# Patient Record
Sex: FEMALE | Race: WHITE | Employment: PART TIME | URBAN - METROPOLITAN AREA
[De-identification: names, ages, dates, MRNs, and addresses within clinical notes are randomized per-mention and may not be internally consistent; named-entity substitution may affect disease eponyms.]

---

## 2017-01-06 ENCOUNTER — OFFICE VISIT (OUTPATIENT)
Dept: PERINATAL CARE | Facility: HOSPITAL | Age: 37
End: 2017-01-06
Attending: OBSTETRICS & GYNECOLOGY
Payer: COMMERCIAL

## 2017-01-06 VITALS
BODY MASS INDEX: 28 KG/M2 | HEART RATE: 90 BPM | DIASTOLIC BLOOD PRESSURE: 61 MMHG | WEIGHT: 144 LBS | SYSTOLIC BLOOD PRESSURE: 122 MMHG

## 2017-01-06 DIAGNOSIS — O09.513 AMA (ADVANCED MATERNAL AGE) PRIMIGRAVIDA 35+, THIRD TRIMESTER: Primary | ICD-10-CM

## 2017-01-06 DIAGNOSIS — O35.8XX0 SHORT FEMUR OF FETUS ON PRENATAL ULTRASOUND: ICD-10-CM

## 2017-01-06 PROBLEM — O35.HXX0 SHORT FEMUR OF FETUS ON PRENATAL ULTRASOUND (HCC): Status: ACTIVE | Noted: 2017-01-06

## 2017-01-06 PROBLEM — O35.HXX0 SHORT FEMUR OF FETUS ON PRENATAL ULTRASOUND: Status: ACTIVE | Noted: 2017-01-06

## 2017-01-06 PROCEDURE — 76805 OB US >/= 14 WKS SNGL FETUS: CPT

## 2017-01-06 PROCEDURE — 99214 OFFICE O/P EST MOD 30 MIN: CPT

## 2017-01-06 NOTE — PROGRESS NOTES
Sergo Cameron    Dear Dr. Jasson Johnson    Thank you for requesting ultrasound evaluation and maternal fetal medicine consultation on your patient Dolly Zhu.   As you are aware she is a 43year old female  with a [place] pr BENJAMÍN  10.8 cm. Placenta: posterior. Fetal Anatomy:  Visualized with normal appearance: 4 chamber heart and great vessels, bladder. Brain: Visualized and normal appearance: cerebellum. Gastrointestinal Tract: stomach visible.     Summary of Andrew Palmer

## 2017-01-13 ENCOUNTER — OFFICE VISIT (OUTPATIENT)
Dept: OBGYN CLINIC | Facility: CLINIC | Age: 37
End: 2017-01-13

## 2017-01-13 VITALS
WEIGHT: 146 LBS | SYSTOLIC BLOOD PRESSURE: 112 MMHG | BODY MASS INDEX: 28.66 KG/M2 | HEIGHT: 60 IN | DIASTOLIC BLOOD PRESSURE: 60 MMHG

## 2017-01-13 DIAGNOSIS — Z67.91 RH NEGATIVE STATE IN ANTEPARTUM PERIOD, THIRD TRIMESTER: ICD-10-CM

## 2017-01-13 DIAGNOSIS — O09.523 AMA (ADVANCED MATERNAL AGE) MULTIGRAVIDA 35+, THIRD TRIMESTER: Primary | ICD-10-CM

## 2017-01-13 DIAGNOSIS — O26.893 RH NEGATIVE STATE IN ANTEPARTUM PERIOD, THIRD TRIMESTER: ICD-10-CM

## 2017-01-13 DIAGNOSIS — Z23 NEED FOR VACCINATION: ICD-10-CM

## 2017-01-13 PROCEDURE — 96372 THER/PROPH/DIAG INJ SC/IM: CPT | Performed by: OBSTETRICS & GYNECOLOGY

## 2017-01-13 PROCEDURE — 90715 TDAP VACCINE 7 YRS/> IM: CPT | Performed by: OBSTETRICS & GYNECOLOGY

## 2017-01-13 PROCEDURE — 90471 IMMUNIZATION ADMIN: CPT | Performed by: OBSTETRICS & GYNECOLOGY

## 2017-01-13 NOTE — PROGRESS NOTES
ADAM  Doing well, +FM  Denies VB/LOF/uctx  Rh neg, rhogham and TDAP received   1hr glucose 198, 3hr wnl  RTC in 2 wks  Fetal movement instructions given  nsts 36 wks

## 2017-01-25 ENCOUNTER — NURSE ONLY (OUTPATIENT)
Dept: ALLERGY | Facility: CLINIC | Age: 37
End: 2017-01-25

## 2017-01-25 DIAGNOSIS — J30.89 ENVIRONMENTAL AND SEASONAL ALLERGIES: Primary | ICD-10-CM

## 2017-01-25 PROCEDURE — 95117 IMMUNOTHERAPY INJECTIONS: CPT | Performed by: ALLERGY & IMMUNOLOGY

## 2017-01-28 ENCOUNTER — OFFICE VISIT (OUTPATIENT)
Dept: OBGYN CLINIC | Facility: CLINIC | Age: 37
End: 2017-01-28

## 2017-01-28 VITALS
BODY MASS INDEX: 28.66 KG/M2 | WEIGHT: 146 LBS | HEIGHT: 60 IN | DIASTOLIC BLOOD PRESSURE: 62 MMHG | SYSTOLIC BLOOD PRESSURE: 102 MMHG

## 2017-01-28 DIAGNOSIS — O09.523 AMA (ADVANCED MATERNAL AGE) MULTIGRAVIDA 35+, THIRD TRIMESTER: Primary | ICD-10-CM

## 2017-01-28 NOTE — PROGRESS NOTES
ADAM  Doing well, +FM  Denies VB/LOF/uctx  Rh neg, rhogham received, TDAP received  RTC in 2 wks  Fetal movement instructions given  Size less than dates, MFM US showed approp growth but FL <3rd percentile, repeat Growth scan feb 2nd  nst 36 wks

## 2017-02-02 ENCOUNTER — TELEPHONE (OUTPATIENT)
Dept: OBGYN CLINIC | Facility: CLINIC | Age: 37
End: 2017-02-02

## 2017-02-02 ENCOUNTER — OFFICE VISIT (OUTPATIENT)
Dept: PERINATAL CARE | Facility: HOSPITAL | Age: 37
End: 2017-02-02
Attending: OBSTETRICS & GYNECOLOGY
Payer: COMMERCIAL

## 2017-02-02 VITALS
DIASTOLIC BLOOD PRESSURE: 63 MMHG | BODY MASS INDEX: 28.86 KG/M2 | RESPIRATION RATE: 16 BRPM | HEART RATE: 89 BPM | WEIGHT: 147 LBS | SYSTOLIC BLOOD PRESSURE: 119 MMHG | HEIGHT: 60 IN

## 2017-02-02 DIAGNOSIS — O36.5930 SGA (SMALL FOR GESTATIONAL AGE), FETAL, AFFECTING CARE OF MOTHER, ANTEPARTUM, THIRD TRIMESTER, NOT APPLICABLE OR UNSPECIFIED FETUS: ICD-10-CM

## 2017-02-02 DIAGNOSIS — O09.523 AMA (ADVANCED MATERNAL AGE) MULTIGRAVIDA 35+, THIRD TRIMESTER: Primary | ICD-10-CM

## 2017-02-02 PROCEDURE — 76820 UMBILICAL ARTERY ECHO: CPT

## 2017-02-02 PROCEDURE — 76819 FETAL BIOPHYS PROFIL W/O NST: CPT

## 2017-02-02 PROCEDURE — 76805 OB US >/= 14 WKS SNGL FETUS: CPT

## 2017-02-02 PROCEDURE — 99214 OFFICE O/P EST MOD 30 MIN: CPT

## 2017-02-02 NOTE — PROGRESS NOTES
Pt here for growth US/ femur measuring small  States +FM  Complaints: increase in contractions/ marion poon. Denies discharge. States little bit of cramping.

## 2017-02-02 NOTE — TELEPHONE ENCOUNTER
Pt dropped off [facility] Paperwork    Pt paid $25.00    Put in [facility] bin

## 2017-02-02 NOTE — PROGRESS NOTES
Cecille Vu    Dear Dr. Shekhar Maldonado    Thank you for requesting ultrasound evaluation and maternal fetal medicine consultation on your patient Gricelda Rivera.   As you are aware she is a 43year old female  with a [place] pr (g) 1407 g  19th%     Fetal heart activity: present. Fetal heart rate: 153 bpm.   Fetal presentation: cephalic. Placenta: posterior. Fetal Anatomy:  Visualized with normal appearance: bladder. Brain: Visualized and normal appearance: cerebellum. of this diagnosis, enhanced fetal surveillance is advised with twice weekly AP testing (weekly NST's with OB, weekly BPP / Dopplers with MFM) and a follow-up growth U/S  in 3 weeks.   In the absence of non-reassuring fetal status or an earlier maternal aldo

## 2017-02-09 ENCOUNTER — MED REC SCAN ONLY (OUTPATIENT)
Dept: OBGYN CLINIC | Facility: CLINIC | Age: 37
End: 2017-02-09

## 2017-02-09 ENCOUNTER — OFFICE VISIT (OUTPATIENT)
Dept: PERINATAL CARE | Facility: HOSPITAL | Age: 37
End: 2017-02-09
Attending: OBSTETRICS & GYNECOLOGY
Payer: COMMERCIAL

## 2017-02-09 VITALS
HEART RATE: 92 BPM | DIASTOLIC BLOOD PRESSURE: 71 MMHG | SYSTOLIC BLOOD PRESSURE: 136 MMHG | BODY MASS INDEX: 29 KG/M2 | WEIGHT: 147 LBS

## 2017-02-09 DIAGNOSIS — O35.8XX0 SHORT FEMUR OF FETUS ON PRENATAL ULTRASOUND: ICD-10-CM

## 2017-02-09 DIAGNOSIS — O09.513 AMA (ADVANCED MATERNAL AGE) PRIMIGRAVIDA 35+, THIRD TRIMESTER: Primary | ICD-10-CM

## 2017-02-09 PROCEDURE — 76820 UMBILICAL ARTERY ECHO: CPT

## 2017-02-09 PROCEDURE — 99213 OFFICE O/P EST LOW 20 MIN: CPT

## 2017-02-09 PROCEDURE — 76819 FETAL BIOPHYS PROFIL W/O NST: CPT

## 2017-02-09 NOTE — PROGRESS NOTES
Taina Whitt   Dear Dr. Estefani Amaya   Thank you for requesting ultrasound evaluation and maternal fetal medicine consultation on your patient Melo Zuniga.  As you are aware she is a 43year old female  with a [place] pregn reassuring    ____________________________________________________________________________  Doppler:  Fetal Doppler:  Umbilical Artery: PS 26.4 cm/s    ED 15.09 cm/s    S/D ratio 2.98     RI 0.66    Right Middle Cerebral Artery: PS 44.8 cm/s    ED 11.24 cm ultrasound in 3 weeks   Twice weekly AP testing (weekly NST's with OB & weekly BPP/Dopplers with MFM). Deliver at 38 weeks or earlier if non-reassuring fetal status or an earlier maternal indication.     Thank you for allowing me to participate in the car

## 2017-02-11 ENCOUNTER — OFFICE VISIT (OUTPATIENT)
Dept: OBGYN CLINIC | Facility: CLINIC | Age: 37
End: 2017-02-11

## 2017-02-11 VITALS
BODY MASS INDEX: 29.06 KG/M2 | DIASTOLIC BLOOD PRESSURE: 76 MMHG | WEIGHT: 148 LBS | HEIGHT: 60 IN | SYSTOLIC BLOOD PRESSURE: 122 MMHG

## 2017-02-11 DIAGNOSIS — O09.93 HIGH-RISK PREGNANCY, THIRD TRIMESTER: Primary | ICD-10-CM

## 2017-02-11 DIAGNOSIS — O28.3 ABNORMAL FETAL ULTRASOUND: ICD-10-CM

## 2017-02-11 DIAGNOSIS — O09.523 AMA (ADVANCED MATERNAL AGE) MULTIGRAVIDA 35+, THIRD TRIMESTER: ICD-10-CM

## 2017-02-11 DIAGNOSIS — O36.5920 SMALL FOR DATES AFFECTING MANAGEMENT OF MOTHER, SECOND TRIMESTER, NOT APPLICABLE OR UNSPECIFIED FETUS: Primary | ICD-10-CM

## 2017-02-11 DIAGNOSIS — O36.5920 SMALL FOR DATES AFFECTING MANAGEMENT OF MOTHER, SECOND TRIMESTER, NOT APPLICABLE OR UNSPECIFIED FETUS: ICD-10-CM

## 2017-02-11 DIAGNOSIS — O09.521 AMA (ADVANCED MATERNAL AGE) MULTIGRAVIDA 35+, FIRST TRIMESTER: ICD-10-CM

## 2017-02-11 PROCEDURE — 59025 FETAL NON-STRESS TEST: CPT | Performed by: OBSTETRICS & GYNECOLOGY

## 2017-02-11 NOTE — PATIENT INSTRUCTIONS
[facility]  [phone]    FETAL MOVEMENT CHART    Begin counting the baby's movements when you wake in the morning, or at approximately 9:00 a.m. Count ten separate times that the baby moves.   A movement can be either a kick, a swish, 3p                        4p                        5p                        6p                           week 40     week 39     week 43        M T W T F S S M T W T F S S M T W T F S S   6a                        7a

## 2017-02-11 NOTE — PROGRESS NOTES
No problems since last seen. Good FM. Does not know sex of infant. Got flu and Tdap vaccines. Also got RhoGAM. Normal  testing with MFM this week. Growth ultrasound in 2 to 3 weeks. [facility] given. See weekly with NST's.

## 2017-02-14 ENCOUNTER — OFFICE VISIT (OUTPATIENT)
Dept: OBGYN CLINIC | Facility: CLINIC | Age: 37
End: 2017-02-14

## 2017-02-14 VITALS
HEIGHT: 60 IN | SYSTOLIC BLOOD PRESSURE: 108 MMHG | DIASTOLIC BLOOD PRESSURE: 62 MMHG | BODY MASS INDEX: 29.06 KG/M2 | WEIGHT: 148 LBS

## 2017-02-14 DIAGNOSIS — O36.5920 SMALL FOR DATES AFFECTING MANAGEMENT OF MOTHER, SECOND TRIMESTER, NOT APPLICABLE OR UNSPECIFIED FETUS: ICD-10-CM

## 2017-02-14 DIAGNOSIS — O09.521 AMA (ADVANCED MATERNAL AGE) MULTIGRAVIDA 35+, FIRST TRIMESTER: Primary | ICD-10-CM

## 2017-02-14 NOTE — PATIENT INSTRUCTIONS
FETAL MOVEMENT CHART    Begin counting the baby's movements when you awake in the morning, or at approximately 9:00 a.m. Count ten separate times that the baby moves. A movement can be either a kick, a swish, a turn or a flip of the baby inside.     Yamile Montiel

## 2017-02-14 NOTE — PROGRESS NOTES
ADAM  Doing well,  GOOD FM  Denies VB/LOF/uctx  RTC in 1 wk for ADAM/ NST- alternating BPP with MFM twice weekly  NST Reactive  Fetal movement instructions given

## 2017-02-16 ENCOUNTER — OFFICE VISIT (OUTPATIENT)
Dept: PERINATAL CARE | Facility: HOSPITAL | Age: 37
End: 2017-02-16
Attending: OBSTETRICS & GYNECOLOGY
Payer: COMMERCIAL

## 2017-02-16 VITALS
WEIGHT: 148 LBS | SYSTOLIC BLOOD PRESSURE: 132 MMHG | DIASTOLIC BLOOD PRESSURE: 65 MMHG | BODY MASS INDEX: 29 KG/M2 | HEART RATE: 104 BPM

## 2017-02-16 DIAGNOSIS — O09.523 AMA (ADVANCED MATERNAL AGE) MULTIGRAVIDA 35+, THIRD TRIMESTER: Primary | ICD-10-CM

## 2017-02-16 PROCEDURE — 99213 OFFICE O/P EST LOW 20 MIN: CPT

## 2017-02-16 PROCEDURE — 76820 UMBILICAL ARTERY ECHO: CPT

## 2017-02-16 PROCEDURE — 76819 FETAL BIOPHYS PROFIL W/O NST: CPT

## 2017-02-16 NOTE — PROGRESS NOTES
Follow up MFM ultrasound with persistent short FL - probably constitutional. Small AC and 'borderline' IUGR. BENJAMÍN and fetal testing normal. No change in recommendations from previous ultrasound.

## 2017-02-16 NOTE — PROGRESS NOTES
Lexi Carter   Dear Dr. Ebony Thornton  Thank you for requesting ultrasound evaluation and maternal fetal medicine consultation on your patient Rodrigue Almeida.  As you are aware she is a 43year old female  with a [place] pregna reassuring    ____________________________________________________________________________  Doppler:  Fetal Doppler:  Umbilical Artery: PS 30.4 cm/s    ED 19.30 cm/s    S/D ratio 2.80     RI 0.64    Right Middle Cerebral Artery: PS 42.5 cm/s   Impression: BPP/Dopplers with MFM). Deliver at 38 weeks or earlier if non-reassuring fetal status or an earlier maternal indication. Thank you for allowing me to participate in the care of your patient.  Please do not hesitate to contact me if additional questions

## 2017-02-21 ENCOUNTER — OFFICE VISIT (OUTPATIENT)
Dept: OBGYN CLINIC | Facility: CLINIC | Age: 37
End: 2017-02-21

## 2017-02-21 VITALS
HEIGHT: 60 IN | DIASTOLIC BLOOD PRESSURE: 66 MMHG | SYSTOLIC BLOOD PRESSURE: 108 MMHG | BODY MASS INDEX: 29.45 KG/M2 | WEIGHT: 150 LBS

## 2017-02-21 DIAGNOSIS — Z3A.34 34 WEEKS GESTATION OF PREGNANCY: Primary | ICD-10-CM

## 2017-02-21 DIAGNOSIS — O35.8XX0 SHORT FEMUR OF FETUS ON PRENATAL ULTRASOUND: ICD-10-CM

## 2017-02-21 DIAGNOSIS — Z3A.34 34 WEEKS GESTATION OF PREGNANCY: ICD-10-CM

## 2017-02-21 DIAGNOSIS — O09.521 AMA (ADVANCED MATERNAL AGE) MULTIGRAVIDA 35+, FIRST TRIMESTER: Primary | ICD-10-CM

## 2017-02-21 DIAGNOSIS — O09.513 AMA (ADVANCED MATERNAL AGE) PRIMIGRAVIDA 35+, THIRD TRIMESTER: ICD-10-CM

## 2017-02-21 DIAGNOSIS — O09.521 AMA (ADVANCED MATERNAL AGE) MULTIGRAVIDA 35+, FIRST TRIMESTER: ICD-10-CM

## 2017-02-21 PROCEDURE — 59025 FETAL NON-STRESS TEST: CPT | Performed by: OBSTETRICS & GYNECOLOGY

## 2017-02-21 NOTE — PATIENT INSTRUCTIONS
FETAL MOVEMENT CHART    Begin counting the baby's movements when you awake in the morning, or at approximately 9:00 a.m. Count ten separate times that the baby moves. A movement can be either a kick, a swish, a turn or a flip of the baby inside.     Kaitlynn Mahan

## 2017-02-22 ENCOUNTER — TELEPHONE (OUTPATIENT)
Dept: OBGYN CLINIC | Facility: CLINIC | Age: 37
End: 2017-02-22

## 2017-02-23 ENCOUNTER — NURSE ONLY (OUTPATIENT)
Dept: ALLERGY | Facility: CLINIC | Age: 37
End: 2017-02-23

## 2017-02-23 ENCOUNTER — OFFICE VISIT (OUTPATIENT)
Dept: PERINATAL CARE | Facility: HOSPITAL | Age: 37
End: 2017-02-23
Attending: OBSTETRICS & GYNECOLOGY
Payer: COMMERCIAL

## 2017-02-23 ENCOUNTER — HOSPITAL ENCOUNTER (OUTPATIENT)
Facility: HOSPITAL | Age: 37
Discharge: HOME | End: 2017-02-23
Attending: OBSTETRICS & GYNECOLOGY | Admitting: OBSTETRICS & GYNECOLOGY
Payer: COMMERCIAL

## 2017-02-23 VITALS
BODY MASS INDEX: 29 KG/M2 | WEIGHT: 150 LBS | DIASTOLIC BLOOD PRESSURE: 69 MMHG | HEART RATE: 93 BPM | SYSTOLIC BLOOD PRESSURE: 124 MMHG

## 2017-02-23 DIAGNOSIS — J30.89 ENVIRONMENTAL AND SEASONAL ALLERGIES: Primary | ICD-10-CM

## 2017-02-23 DIAGNOSIS — O09.523 AMA (ADVANCED MATERNAL AGE) MULTIGRAVIDA 35+, THIRD TRIMESTER: ICD-10-CM

## 2017-02-23 DIAGNOSIS — O36.5930 IUGR (INTRAUTERINE GROWTH RESTRICTION) AFFECTING CARE OF MOTHER, THIRD TRIMESTER, NOT APPLICABLE OR UNSPECIFIED FETUS: Primary | ICD-10-CM

## 2017-02-23 PROCEDURE — 99214 OFFICE O/P EST MOD 30 MIN: CPT

## 2017-02-23 PROCEDURE — 76821 MIDDLE CEREBRAL ARTERY ECHO: CPT

## 2017-02-23 PROCEDURE — 96372 THER/PROPH/DIAG INJ SC/IM: CPT

## 2017-02-23 PROCEDURE — 76819 FETAL BIOPHYS PROFIL W/O NST: CPT

## 2017-02-23 PROCEDURE — 95165 ANTIGEN THERAPY SERVICES: CPT | Performed by: ALLERGY & IMMUNOLOGY

## 2017-02-23 PROCEDURE — 76820 UMBILICAL ARTERY ECHO: CPT

## 2017-02-23 PROCEDURE — 95117 IMMUNOTHERAPY INJECTIONS: CPT | Performed by: ALLERGY & IMMUNOLOGY

## 2017-02-23 PROCEDURE — 76805 OB US >/= 14 WKS SNGL FETUS: CPT

## 2017-02-23 RX ORDER — BETAMETHASONE SODIUM PHOSPHATE AND BETAMETHASONE ACETATE 3; 3 MG/ML; MG/ML
12 INJECTION, SUSPENSION INTRA-ARTICULAR; INTRALESIONAL; INTRAMUSCULAR; SOFT TISSUE ONCE
Status: DISCONTINUED | OUTPATIENT
Start: 2017-02-24 | End: 2017-02-23

## 2017-02-23 RX ORDER — BETAMETHASONE SODIUM PHOSPHATE AND BETAMETHASONE ACETATE 3; 3 MG/ML; MG/ML
INJECTION, SUSPENSION INTRA-ARTICULAR; INTRALESIONAL; INTRAMUSCULAR; SOFT TISSUE
Status: DISCONTINUED
Start: 2017-02-23 | End: 2017-02-23

## 2017-02-23 RX ORDER — BETAMETHASONE SODIUM PHOSPHATE AND BETAMETHASONE ACETATE 3; 3 MG/ML; MG/ML
12 INJECTION, SUSPENSION INTRA-ARTICULAR; INTRALESIONAL; INTRAMUSCULAR; SOFT TISSUE ONCE
Status: COMPLETED | OUTPATIENT
Start: 2017-02-23 | End: 2017-02-23

## 2017-02-23 NOTE — PROGRESS NOTES
Pt  Is a   Who is here for betamethasone #1. Injection given in l buttock and pt tolerated well. Verbal instructions given to f/u in M at 1530 tomorrow for BENJAMÍN and then to L&D for NST and Beta #2. Pt verbalizes understanding and agreement.

## 2017-02-23 NOTE — PROGRESS NOTES
Indication: Maternal age (43 years). ____________________________________________________________________________  History: Age: 43 years. Maternal age at [facility]: 43 years.   __________________________________________________________________________ 8.     Summary:  testing is reassuring. We discussed fetal movement counts.    ____________________________________________________________________________  Doppler:  Fetal Doppler:  Umbilical Artery: PS 43.3 cm/s    ED 21.16 cm/s    S/D ratio 2. percentile but normal HC:AC ratio. I interpreted the results and reviewed them with the patient.    DISCUSSION   During her visit we discussed and reviewed the following issues:   SHORT FEMUR LENGTH   Indeed the femur length is shorter remains than ex have NST & BENJAMÍN tomorrow 17  Delivery is recommended at 37 weeks or sooner if indicated by the  testing    Thank you for allowing me to participate in the care of your patient.  Please do not hesitate to contact me if additional questions or co

## 2017-02-24 ENCOUNTER — HOSPITAL ENCOUNTER (OUTPATIENT)
Facility: HOSPITAL | Age: 37
Discharge: HOME | End: 2017-02-24
Attending: OBSTETRICS & GYNECOLOGY | Admitting: OBSTETRICS & GYNECOLOGY
Payer: COMMERCIAL

## 2017-02-24 ENCOUNTER — HOSPITAL ENCOUNTER (OUTPATIENT)
Facility: HOSPITAL | Age: 37
End: 2017-02-24
Attending: OBSTETRICS & GYNECOLOGY | Admitting: OBSTETRICS & GYNECOLOGY
Payer: COMMERCIAL

## 2017-02-24 ENCOUNTER — OFFICE VISIT (OUTPATIENT)
Dept: PERINATAL CARE | Facility: HOSPITAL | Age: 37
End: 2017-02-24
Attending: OBSTETRICS & GYNECOLOGY
Payer: COMMERCIAL

## 2017-02-24 ENCOUNTER — APPOINTMENT (OUTPATIENT)
Dept: OBGYN CLINIC | Facility: HOSPITAL | Age: 37
End: 2017-02-24
Payer: COMMERCIAL

## 2017-02-24 VITALS — HEART RATE: 97 BPM | SYSTOLIC BLOOD PRESSURE: 132 MMHG | DIASTOLIC BLOOD PRESSURE: 74 MMHG

## 2017-02-24 VITALS
SYSTOLIC BLOOD PRESSURE: 125 MMHG | DIASTOLIC BLOOD PRESSURE: 77 MMHG | TEMPERATURE: 99 F | HEART RATE: 86 BPM | RESPIRATION RATE: 16 BRPM

## 2017-02-24 DIAGNOSIS — O36.5990 IUGR, ANTENATAL: ICD-10-CM

## 2017-02-24 DIAGNOSIS — O09.523 AMA (ADVANCED MATERNAL AGE) MULTIGRAVIDA 35+, THIRD TRIMESTER: ICD-10-CM

## 2017-02-24 PROBLEM — Z34.90 PREGNANCY: Status: ACTIVE | Noted: 2017-02-24

## 2017-02-24 PROBLEM — Z34.90 PREGNANCY (HCC): Status: ACTIVE | Noted: 2017-02-24

## 2017-02-24 PROCEDURE — 59025 FETAL NON-STRESS TEST: CPT | Performed by: OBSTETRICS & GYNECOLOGY

## 2017-02-24 PROCEDURE — 76819 FETAL BIOPHYS PROFIL W/O NST: CPT

## 2017-02-24 PROCEDURE — 76820 UMBILICAL ARTERY ECHO: CPT

## 2017-02-24 PROCEDURE — 99212 OFFICE O/P EST SF 10 MIN: CPT

## 2017-02-24 RX ORDER — BETAMETHASONE SODIUM PHOSPHATE AND BETAMETHASONE ACETATE 3; 3 MG/ML; MG/ML
12 INJECTION, SUSPENSION INTRA-ARTICULAR; INTRALESIONAL; INTRAMUSCULAR; SOFT TISSUE ONCE
Status: COMPLETED | OUTPATIENT
Start: 2017-02-24 | End: 2017-02-24

## 2017-02-24 NOTE — NST
Nonstress Test   Patient: Bailee Fuentes    Gestation: 34w5d    NST:       Variability: Moderate           Accelerations: Yes           Decelerations: None            Baseline: 135 BPM           Uterine Irritability: No           Contractions: Irregular

## 2017-02-24 NOTE — PROGRESS NOTES
Discharge instructions discussed with pt and spouse, both verb understanding, Pt rec'd copy. Pt ambulated out of unit stable and undelivered with spouse. Declines w/c escort out of unit.

## 2017-02-24 NOTE — PROGRESS NOTES
Indication: IUGR, Decreased BENJAMÍN.   ____________________________________________________________________________  History: Age: 43 years. Maternal age at [facility]: 43 years.   ____________________________________________________________________________  Dating: slightly dehydrated. No leaking fluid.     PHYSICAL EXAMINATION:   /74 mmHg  Pulse 97  General: alert and oriented, no acute distress   Abdomen: gravid, soft, non-tender       OBSTETRIC ULTRASOUND   BPP of 8/8 and normal umbilical Doppler velocimetry

## 2017-02-24 NOTE — PROGRESS NOTES
with edc 17 (34 week 5 days gestation) presents to triage 3 after MFM appointment for Betamethasone injection #2, and a NST. Pt states they have been watching the baby closely since it has been measuring small, and now the BENJAMÍN is also lower.  Pt

## 2017-02-28 ENCOUNTER — OFFICE VISIT (OUTPATIENT)
Dept: OBGYN CLINIC | Facility: CLINIC | Age: 37
End: 2017-02-28

## 2017-02-28 VITALS — SYSTOLIC BLOOD PRESSURE: 120 MMHG | WEIGHT: 148 LBS | BODY MASS INDEX: 29 KG/M2 | DIASTOLIC BLOOD PRESSURE: 72 MMHG

## 2017-02-28 DIAGNOSIS — O09.513 AMA (ADVANCED MATERNAL AGE) PRIMIGRAVIDA 35+, THIRD TRIMESTER: ICD-10-CM

## 2017-02-28 DIAGNOSIS — O36.5930 IUGR (INTRAUTERINE GROWTH RESTRICTION) AFFECTING CARE OF MOTHER, THIRD TRIMESTER, NOT APPLICABLE OR UNSPECIFIED FETUS: ICD-10-CM

## 2017-02-28 DIAGNOSIS — O09.513 AMA (ADVANCED MATERNAL AGE) PRIMIGRAVIDA 35+, THIRD TRIMESTER: Primary | ICD-10-CM

## 2017-02-28 DIAGNOSIS — O36.5930 IUGR (INTRAUTERINE GROWTH RESTRICTION) AFFECTING CARE OF MOTHER, THIRD TRIMESTER, NOT APPLICABLE OR UNSPECIFIED FETUS: Primary | ICD-10-CM

## 2017-02-28 PROCEDURE — 87081 CULTURE SCREEN ONLY: CPT | Performed by: NURSE PRACTITIONER

## 2017-02-28 PROCEDURE — 87147 CULTURE TYPE IMMUNOLOGIC: CPT | Performed by: NURSE PRACTITIONER

## 2017-02-28 NOTE — PATIENT INSTRUCTIONS
FETAL MOVEMENT CHART    Begin counting the baby's movements when you awake in the morning, or at approximately 9:00 a.m. Count ten separate times that the baby moves. A movement can be either a kick, a swish, a turn or a flip of the baby inside.     Vincent Goods

## 2017-02-28 NOTE — PATIENT INSTRUCTIONS
Drink 10 or more ounces daily of water    FETAL MOVEMENT CHART    Begin counting the baby's movements when you awake in the morning, or at approximately 9:00 a.m. Count ten separate times that the baby moves.   A movement can be either a kick, a swish, a

## 2017-02-28 NOTE — PROGRESS NOTES
ADAM  Doing well, +FM  Denies VB/LOF/uctx  Mode of delivery:  anticipated  Labor precautions discussed  SVE cl/ long/ -2  GBS collected  RTC 1 week  NST Reactive  Bedside BENJAMÍN done by Dr. Karen Valdez- 7.1  C-dil 3/12/17 6pm  IOL 3/13/17  Has BPP with MFM 3/3/1

## 2017-03-03 ENCOUNTER — OFFICE VISIT (OUTPATIENT)
Dept: PERINATAL CARE | Facility: HOSPITAL | Age: 37
End: 2017-03-03
Attending: OBSTETRICS & GYNECOLOGY
Payer: COMMERCIAL

## 2017-03-03 VITALS
DIASTOLIC BLOOD PRESSURE: 71 MMHG | WEIGHT: 147 LBS | SYSTOLIC BLOOD PRESSURE: 123 MMHG | HEART RATE: 111 BPM | BODY MASS INDEX: 29 KG/M2

## 2017-03-03 DIAGNOSIS — O28.8 AMNIOTIC FLUID INDEX BORDERLINE LOW: ICD-10-CM

## 2017-03-03 DIAGNOSIS — O36.5930 IUGR (INTRAUTERINE GROWTH RESTRICTION) AFFECTING CARE OF MOTHER, THIRD TRIMESTER, NOT APPLICABLE OR UNSPECIFIED FETUS: ICD-10-CM

## 2017-03-03 DIAGNOSIS — O35.8XX0 SHORT FEMUR OF FETUS ON PRENATAL ULTRASOUND: ICD-10-CM

## 2017-03-03 DIAGNOSIS — O09.523 AMA (ADVANCED MATERNAL AGE) MULTIGRAVIDA 35+, THIRD TRIMESTER: Primary | ICD-10-CM

## 2017-03-03 PROCEDURE — 99214 OFFICE O/P EST MOD 30 MIN: CPT

## 2017-03-03 PROCEDURE — 76819 FETAL BIOPHYS PROFIL W/O NST: CPT

## 2017-03-03 PROCEDURE — 76820 UMBILICAL ARTERY ECHO: CPT

## 2017-03-03 NOTE — PROGRESS NOTES
Quick Note:    Positive GBS, will need treatment in labor and delivery.  Left results on confidential voice mail.  ______

## 2017-03-03 NOTE — PROGRESS NOTES
Lucy Bryan   Dear Dr. Kwan Wang  Thank you for requesting ultrasound evaluation and maternal fetal medicine consultation on your patient Flor Pathak.  As you are aware she is a 43year old female  with a [place] pregna reassuring    ____________________________________________________________________________  Doppler:  Fetal Doppler:  Umbilical Artery: PS 57.4 cm/s    ED 19.65 cm/s    S/D ratio 2.80     RI 0.64    Right Middle Cerebral Artery: PS 57.0 cm/s    ED 17.68 cm testing  Low normal BENJAMÍN: 5.9 cm today, remained stable    RECOMMENDATIONS:   Continue care with Dr. Jolie Sinha   Twice weekly AP testing (weekly NST's with OB & weekly BPP/Dopplers with MFM).    Deliver at 37 weeks or earlier if non-reassuring fetal status or a

## 2017-03-06 ENCOUNTER — TELEPHONE (OUTPATIENT)
Dept: OBGYN UNIT | Facility: HOSPITAL | Age: 37
End: 2017-03-06

## 2017-03-07 ENCOUNTER — OFFICE VISIT (OUTPATIENT)
Dept: OBGYN CLINIC | Facility: CLINIC | Age: 37
End: 2017-03-07

## 2017-03-07 VITALS — WEIGHT: 146 LBS | BODY MASS INDEX: 29 KG/M2 | SYSTOLIC BLOOD PRESSURE: 108 MMHG | DIASTOLIC BLOOD PRESSURE: 74 MMHG

## 2017-03-07 DIAGNOSIS — O36.5930 IUGR (INTRAUTERINE GROWTH RESTRICTION) AFFECTING CARE OF MOTHER, THIRD TRIMESTER, NOT APPLICABLE OR UNSPECIFIED FETUS: ICD-10-CM

## 2017-03-07 DIAGNOSIS — O35.8XX0 SHORT FEMUR OF FETUS ON PRENATAL ULTRASOUND: ICD-10-CM

## 2017-03-07 DIAGNOSIS — O09.513 AMA (ADVANCED MATERNAL AGE) PRIMIGRAVIDA 35+, THIRD TRIMESTER: ICD-10-CM

## 2017-03-07 DIAGNOSIS — O36.5930 IUGR (INTRAUTERINE GROWTH RESTRICTION) AFFECTING CARE OF MOTHER, THIRD TRIMESTER, NOT APPLICABLE OR UNSPECIFIED FETUS: Primary | ICD-10-CM

## 2017-03-07 DIAGNOSIS — O09.93 HIGH-RISK PREGNANCY, THIRD TRIMESTER: Primary | ICD-10-CM

## 2017-03-07 PROCEDURE — 59025 FETAL NON-STRESS TEST: CPT | Performed by: OBSTETRICS & GYNECOLOGY

## 2017-03-07 NOTE — PROGRESS NOTES
Random B-H contractions only. Good FM. Will be seeing MFM on Friday. Has IOL scheduled already - Cervidil on , the  and IOL next day. Technique and risks for serial day IOL and increase risk of  section due to fetal indications discussed.

## 2017-03-07 NOTE — PROGRESS NOTES
Normal  testing with MFM. BENJAMÍN low but stable. Needs twice weekly testing - NST in our office and BPP/dopplers with MFM. Recommended delivery in 37th week.

## 2017-03-09 ENCOUNTER — TELEPHONE (OUTPATIENT)
Dept: OBGYN UNIT | Facility: HOSPITAL | Age: 37
End: 2017-03-09

## 2017-03-09 ENCOUNTER — NURSE ONLY (OUTPATIENT)
Dept: ALLERGY | Facility: CLINIC | Age: 37
End: 2017-03-09

## 2017-03-09 ENCOUNTER — TELEPHONE (OUTPATIENT)
Dept: OBGYN CLINIC | Facility: CLINIC | Age: 37
End: 2017-03-09

## 2017-03-09 DIAGNOSIS — J30.89 ENVIRONMENTAL AND SEASONAL ALLERGIES: Primary | ICD-10-CM

## 2017-03-09 PROCEDURE — 95165 ANTIGEN THERAPY SERVICES: CPT | Performed by: ALLERGY & IMMUNOLOGY

## 2017-03-09 PROCEDURE — 95117 IMMUNOTHERAPY INJECTIONS: CPT | Performed by: ALLERGY & IMMUNOLOGY

## 2017-03-09 RX ORDER — BREAST PUMP
EACH MISCELLANEOUS
Qty: 1 EACH | Refills: 0 | OUTPATIENT
Start: 2017-03-09 | End: 2017-07-11

## 2017-03-10 ENCOUNTER — OFFICE VISIT (OUTPATIENT)
Dept: PERINATAL CARE | Facility: HOSPITAL | Age: 37
End: 2017-03-10
Attending: OBSTETRICS & GYNECOLOGY
Payer: COMMERCIAL

## 2017-03-10 VITALS
HEART RATE: 94 BPM | WEIGHT: 147 LBS | BODY MASS INDEX: 28.86 KG/M2 | DIASTOLIC BLOOD PRESSURE: 78 MMHG | SYSTOLIC BLOOD PRESSURE: 131 MMHG | HEIGHT: 60 IN | RESPIRATION RATE: 18 BRPM

## 2017-03-10 DIAGNOSIS — O35.8XX0 SHORT FEMUR OF FETUS ON PRENATAL ULTRASOUND: Primary | ICD-10-CM

## 2017-03-10 DIAGNOSIS — O36.5930 IUGR (INTRAUTERINE GROWTH RESTRICTION) AFFECTING CARE OF MOTHER, THIRD TRIMESTER, NOT APPLICABLE OR UNSPECIFIED FETUS: ICD-10-CM

## 2017-03-10 DIAGNOSIS — O28.8 AMNIOTIC FLUID INDEX BORDERLINE LOW: ICD-10-CM

## 2017-03-10 PROCEDURE — 76820 UMBILICAL ARTERY ECHO: CPT

## 2017-03-10 PROCEDURE — 76819 FETAL BIOPHYS PROFIL W/O NST: CPT

## 2017-03-10 PROCEDURE — 99214 OFFICE O/P EST MOD 30 MIN: CPT

## 2017-03-10 NOTE — PROGRESS NOTES
Taina Whitt   Dear Dr. Rebel Azul  Thank you for requesting ultrasound evaluation and maternal fetal medicine consultation on your patient Melo Zuniga.  As you are aware she is a 43year old female  with a [place] pregna  testing is reassuring    ____________________________________________________________________________  Doppler:  Fetal Doppler:  Umbilical Artery: PS 39.7 cm/s    ED 15.09 cm/s    S/D ratio 2.65     RI 0.62    Right Middle Cerebral Artery: PS 60. invasive testing   Low normal BENJAMÍN: 6.5 cm today, remained stable    RECOMMENDATIONS:   Continue care with Dr. Edmund Davidson at 40 weeks or earlier if non-reassuring fetal status or an earlier maternal indication; IOL to start on Jesus 3/12/17    Thank

## 2017-03-10 NOTE — PROGRESS NOTES
Normal  testing with MFM including BPP and dopplers. BENJAMÍN up to 6.5 cm. Scheduled for Cervidil and IOL on 3/12/16.

## 2017-03-12 ENCOUNTER — APPOINTMENT (OUTPATIENT)
Dept: OBGYN CLINIC | Facility: HOSPITAL | Age: 37
End: 2017-03-12
Payer: COMMERCIAL

## 2017-03-12 ENCOUNTER — HOSPITAL ENCOUNTER (INPATIENT)
Facility: HOSPITAL | Age: 37
LOS: 4 days | Discharge: HOME | End: 2017-03-16
Attending: OBSTETRICS & GYNECOLOGY | Admitting: OBSTETRICS & GYNECOLOGY
Payer: COMMERCIAL

## 2017-03-12 LAB
ANTIBODY SCREEN: POSITIVE
BILIRUBIN URINE: NEGATIVE
BLOOD URINE: NEGATIVE
CONTROL RUN WITHIN 24 HOURS?: YES
ERYTHROCYTE [DISTWIDTH] IN BLOOD BY AUTOMATED COUNT: 12.9 % (ref 11.5–16)
GLUCOSE URINE: NEGATIVE
HCT VFR BLD AUTO: 38.5 % (ref 34–50)
HGB BLD-MCNC: 13.5 G/DL (ref 12–16)
KETONE URINE: 80
LEUKOCYTE ESTERASE URINE: NEGATIVE
MCH RBC QN AUTO: 30.5 PG (ref 27–33.2)
MCHC RBC AUTO-ENTMCNC: 35.1 G/DL (ref 31–37)
MCV RBC AUTO: 87.1 FL (ref 81–100)
NITRITE URINE: NEGATIVE
PH URINE: 5.5 (ref 5–8)
PLATELET # BLD AUTO: 231 10(3)UL (ref 150–450)
PROTEIN URINE: NEGATIVE
RBC # BLD AUTO: 4.42 X10(6)UL (ref 3.8–5.1)
RED CELL DISTRIBUTION WIDTH-SD: 39.9 FL (ref 35.1–46.3)
RH BLOOD TYPE: NEGATIVE
SPEC GRAVITY: 1.03 (ref 1–1.03)
T PALLIDUM AB SER QL IA: NONREACTIVE
URINE CLARITY: CLEAR
URINE COLOR: YELLOW
UROBILINOGEN URINE: 0.2
WBC # BLD AUTO: 10.4 X10(3) UL (ref 4–13)

## 2017-03-12 RX ORDER — DEXTROSE, SODIUM CHLORIDE, SODIUM LACTATE, POTASSIUM CHLORIDE, AND CALCIUM CHLORIDE 5; .6; .31; .03; .02 G/100ML; G/100ML; G/100ML; G/100ML; G/100ML
INJECTION, SOLUTION INTRAVENOUS AS NEEDED
Status: DISCONTINUED | OUTPATIENT
Start: 2017-03-12 | End: 2017-03-14 | Stop reason: HOSPADM

## 2017-03-12 RX ORDER — SODIUM CHLORIDE, SODIUM LACTATE, POTASSIUM CHLORIDE, CALCIUM CHLORIDE 600; 310; 30; 20 MG/100ML; MG/100ML; MG/100ML; MG/100ML
INJECTION, SOLUTION INTRAVENOUS CONTINUOUS
Status: DISCONTINUED | OUTPATIENT
Start: 2017-03-12 | End: 2017-03-14 | Stop reason: HOSPADM

## 2017-03-12 RX ORDER — PSEUDOEPHEDRINE HCL 120 MG/1
120 TABLET, FILM COATED, EXTENDED RELEASE ORAL EVERY 12 HOURS
Status: DISCONTINUED | OUTPATIENT
Start: 2017-03-12 | End: 2017-03-12

## 2017-03-12 RX ORDER — ZOLPIDEM TARTRATE 5 MG/1
5 TABLET ORAL NIGHTLY PRN
Status: DISCONTINUED | OUTPATIENT
Start: 2017-03-12 | End: 2017-03-14 | Stop reason: HOSPADM

## 2017-03-12 RX ORDER — TERBUTALINE SULFATE 1 MG/ML
0.25 INJECTION, SOLUTION SUBCUTANEOUS AS NEEDED
Status: DISCONTINUED | OUTPATIENT
Start: 2017-03-12 | End: 2017-03-14 | Stop reason: HOSPADM

## 2017-03-12 RX ORDER — ACETAMINOPHEN 500 MG
1000 TABLET ORAL EVERY 6 HOURS PRN
COMMUNITY
End: 2017-07-11

## 2017-03-12 RX ORDER — IBUPROFEN 600 MG/1
600 TABLET ORAL ONCE AS NEEDED
Status: DISCONTINUED | OUTPATIENT
Start: 2017-03-12 | End: 2017-03-14 | Stop reason: HOSPADM

## 2017-03-13 RX ORDER — ONDANSETRON 2 MG/ML
4 INJECTION INTRAMUSCULAR; INTRAVENOUS EVERY 6 HOURS PRN
Status: DISCONTINUED | OUTPATIENT
Start: 2017-03-13 | End: 2017-03-14

## 2017-03-13 RX ORDER — ACETAMINOPHEN 500 MG
1000 TABLET ORAL EVERY 6 HOURS PRN
Status: DISCONTINUED | OUTPATIENT
Start: 2017-03-13 | End: 2017-03-14

## 2017-03-13 RX ORDER — EPHEDRINE SULFATE 50 MG/ML
5 INJECTION, SOLUTION INTRAVENOUS AS NEEDED
Status: DISCONTINUED | OUTPATIENT
Start: 2017-03-13 | End: 2017-03-14

## 2017-03-13 RX ORDER — NALBUPHINE HCL 10 MG/ML
2.5 AMPUL (ML) INJECTION
Status: DISCONTINUED | OUTPATIENT
Start: 2017-03-13 | End: 2017-03-16

## 2017-03-13 RX ORDER — ONDANSETRON 2 MG/ML
INJECTION INTRAMUSCULAR; INTRAVENOUS
Status: COMPLETED
Start: 2017-03-13 | End: 2017-03-13

## 2017-03-13 NOTE — PROGRESS NOTES
Pt is a 43year old female admitted to   Patient presents with:  Scheduled Induction    Pt is a 37w0d intrauterine pregnancy  Pt is being induced per  MD for IUGR and borderline low BENJAMÍN  History obtained, consents, signed.    Pt oriented to room, staff,

## 2017-03-13 NOTE — PROGRESS NOTES
Received report from Shad, [facility]. RN to bedside. Assessment completed. POC discussed with patient and . Pt verbalized understanding. Call light within reach. Will continue to monitor.

## 2017-03-13 NOTE — PLAN OF CARE
Problem: SAFETY ADULT - FALL  Goal: Free from fall injury  INTERVENTIONS:  - Assess pt frequently for physical needs  - Identify cognitive and physical deficits and behaviors that affect risk of falls.   - [place] fall precautions as indicated by assessme

## 2017-03-13 NOTE — PROGRESS NOTES
Cx: 1-2 cm/50%/-3  AROM, no fluid seen  Epidural placed  Contractions 2-4 minutes  FHT's 145, accelerations present, moderate variability  CPM

## 2017-03-13 NOTE — H&P
[facility]  History & Physical    Rodrigue Almeida Patient Status:  Inpatient    1980 MRN [id number]   [facility] 1NW-A Attending Jovany Bergeron MD    1 PCP Sherri Evans DO     CC: patient is here for IOL, IUGR    SUBJECTIVE: Standard drinks or equivalent per week         Comment: when jnot pregnant       Home Meds:   Prescriptions prior to admission:  acetaminophen 500 MG Oral Tab Take 1,000 mg by mouth every 6 (six) hours as needed for Pain.  Disp:  Rfl:  3/12/2017 at 1500   V

## 2017-03-14 PROCEDURE — 0HQ9XZZ REPAIR PERINEUM SKIN, EXTERNAL APPROACH: ICD-10-PCS | Performed by: OBSTETRICS & GYNECOLOGY

## 2017-03-14 PROCEDURE — 59400 OBSTETRICAL CARE: CPT | Performed by: OBSTETRICS & GYNECOLOGY

## 2017-03-14 PROCEDURE — 3E033VJ INTRODUCTION OF OTHER HORMONE INTO PERIPHERAL VEIN, PERCUTANEOUS APPROACH: ICD-10-PCS | Performed by: OBSTETRICS & GYNECOLOGY

## 2017-03-14 RX ORDER — HYDROCODONE BITARTRATE AND ACETAMINOPHEN 5; 325 MG/1; MG/1
2 TABLET ORAL EVERY 4 HOURS PRN
Status: DISCONTINUED | OUTPATIENT
Start: 2017-03-14 | End: 2017-03-14

## 2017-03-14 RX ORDER — HYDROCODONE BITARTRATE AND ACETAMINOPHEN 5; 325 MG/1; MG/1
1 TABLET ORAL EVERY 4 HOURS PRN
Status: DISCONTINUED | OUTPATIENT
Start: 2017-03-14 | End: 2017-03-14

## 2017-03-14 RX ORDER — ZOLPIDEM TARTRATE 5 MG/1
5 TABLET ORAL NIGHTLY PRN
Status: DISCONTINUED | OUTPATIENT
Start: 2017-03-14 | End: 2017-03-16

## 2017-03-14 RX ORDER — IBUPROFEN 600 MG/1
600 TABLET ORAL EVERY 6 HOURS
Status: DISCONTINUED | OUTPATIENT
Start: 2017-03-14 | End: 2017-03-16

## 2017-03-14 RX ORDER — HYDROCODONE BITARTRATE AND ACETAMINOPHEN 5; 325 MG/1; MG/1
1 TABLET ORAL EVERY 4 HOURS PRN
Status: DISCONTINUED | OUTPATIENT
Start: 2017-03-14 | End: 2017-03-16

## 2017-03-14 RX ORDER — ZOLPIDEM TARTRATE 5 MG/1
5 TABLET ORAL NIGHTLY PRN
Status: DISCONTINUED | OUTPATIENT
Start: 2017-03-14 | End: 2017-03-14

## 2017-03-14 RX ORDER — ACETAMINOPHEN 325 MG/1
650 TABLET ORAL EVERY 4 HOURS PRN
Status: DISCONTINUED | OUTPATIENT
Start: 2017-03-14 | End: 2017-03-14

## 2017-03-14 RX ORDER — ACETAMINOPHEN 325 MG/1
650 TABLET ORAL EVERY 4 HOURS PRN
Status: DISCONTINUED | OUTPATIENT
Start: 2017-03-14 | End: 2017-03-16

## 2017-03-14 RX ORDER — BISACODYL 10 MG
10 SUPPOSITORY, RECTAL RECTAL ONCE AS NEEDED
Status: DISCONTINUED | OUTPATIENT
Start: 2017-03-14 | End: 2017-03-14

## 2017-03-14 RX ORDER — SIMETHICONE 80 MG
80 TABLET,CHEWABLE ORAL 3 TIMES DAILY PRN
Status: DISCONTINUED | OUTPATIENT
Start: 2017-03-14 | End: 2017-03-14

## 2017-03-14 RX ORDER — DOCUSATE SODIUM 100 MG/1
100 CAPSULE, LIQUID FILLED ORAL
Status: DISCONTINUED | OUTPATIENT
Start: 2017-03-14 | End: 2017-03-14

## 2017-03-14 RX ORDER — IBUPROFEN 600 MG/1
600 TABLET ORAL EVERY 6 HOURS
Status: DISCONTINUED | OUTPATIENT
Start: 2017-03-14 | End: 2017-03-14

## 2017-03-14 RX ORDER — SIMETHICONE 80 MG
80 TABLET,CHEWABLE ORAL 3 TIMES DAILY PRN
Status: DISCONTINUED | OUTPATIENT
Start: 2017-03-14 | End: 2017-03-16

## 2017-03-14 RX ORDER — DOCUSATE SODIUM 100 MG/1
100 CAPSULE, LIQUID FILLED ORAL
Status: DISCONTINUED | OUTPATIENT
Start: 2017-03-14 | End: 2017-03-16

## 2017-03-14 RX ORDER — BISACODYL 10 MG
10 SUPPOSITORY, RECTAL RECTAL ONCE AS NEEDED
Status: ACTIVE | OUTPATIENT
Start: 2017-03-14 | End: 2017-03-14

## 2017-03-14 RX ORDER — HYDROCODONE BITARTRATE AND ACETAMINOPHEN 5; 325 MG/1; MG/1
2 TABLET ORAL EVERY 4 HOURS PRN
Status: DISCONTINUED | OUTPATIENT
Start: 2017-03-14 | End: 2017-03-16

## 2017-03-14 NOTE — L&D DELIVERY NOTE
Vaginal Delivery Note          Gricelda Rivera Patient Status:  Inpatient    1980 MRN [id number]   [facility] 1SW-J Attending Liv Betancourt MD   Hosp Day # 2 PCP Nancie Renee DO       Pre Op Dx:  IUP at Term    Post Op Dx: Same - de

## 2017-03-14 NOTE — PROGRESS NOTES
ADMISSION NOTE  Patient admitted to room in stable condition via:wheelchair  Oriented to room, Safety precautions initiated. Bed in low position, call light in reach.    Report received and ID Bands checked & verified with: RN  Plan of care and safety instr

## 2017-03-14 NOTE — PROGRESS NOTES
Patient up to bathroom with assist x 2. Voiding without difficulty. My care completed. Patient transferred to mother/baby room 1115 via wheelchair in stable condition with baby and personal belongings. Accompanied by significant other and staff.   Repor

## 2017-03-15 LAB
BASOPHILS # BLD AUTO: 0.02 X10(3) UL (ref 0–0.1)
BASOPHILS NFR BLD AUTO: 0.1 %
EOSINOPHIL # BLD AUTO: 0.17 X10(3) UL (ref 0–0.3)
EOSINOPHIL NFR BLD AUTO: 1.1 %
ERYTHROCYTE [DISTWIDTH] IN BLOOD BY AUTOMATED COUNT: 13.4 % (ref 11.5–16)
HCT VFR BLD AUTO: 32.7 % (ref 34–50)
HGB BLD-MCNC: 11.3 G/DL (ref 12–16)
IMMATURE GRANULOCYTE COUNT: 0.05 X10(3) UL (ref 0–1)
IMMATURE GRANULOCYTE RATIO %: 0.3 %
LYMPHOCYTES # BLD AUTO: 1.62 X10(3) UL (ref 0.9–4)
LYMPHOCYTES NFR BLD AUTO: 10.8 %
MCH RBC QN AUTO: 30.9 PG (ref 27–33.2)
MCHC RBC AUTO-ENTMCNC: 34.6 G/DL (ref 31–37)
MCV RBC AUTO: 89.3 FL (ref 81–100)
MONOCYTES # BLD AUTO: 0.89 X10(3) UL (ref 0.1–0.6)
MONOCYTES NFR BLD AUTO: 5.9 %
NEUTROPHIL ABS PRELIM: 12.29 X10 (3) UL (ref 1.3–6.7)
NEUTROPHILS # BLD AUTO: 12.29 X10(3) UL (ref 1.3–6.7)
NEUTROPHILS NFR BLD AUTO: 81.8 %
PLATELET # BLD AUTO: 146 10(3)UL (ref 150–450)
RBC # BLD AUTO: 3.66 X10(6)UL (ref 3.8–5.1)
RED CELL DISTRIBUTION WIDTH-SD: 43.8 FL (ref 35.1–46.3)
WBC # BLD AUTO: 15 X10(3) UL (ref 4–13)

## 2017-03-15 NOTE — PAYOR COMM NOTE
Attending Physician: Sandra Garcia MD    Review Type: ADMISSION   Reviewer: Randy BOLES       Date: March 15, 2017 - 11:41 AM  Payor: Willa PLUMMER PPO.EPO.[facility]  Authorization Number: [id number]  Admit date: 3/12/2017  5:57 PM   Admitted f

## 2017-03-15 NOTE — PROGRESS NOTES
[facility]  Post-Partum Progress Note    Cherylene Fent Patient Status:  Inpatient    1980 MRN [id number]   [facility] 1SW-J Attending Lobo Narvaez MD   [facility] Day # 3 PCP Segun Hunt DO     SUBJECTIVE:    Postpartum Day 1:

## 2017-03-16 ENCOUNTER — TELEPHONE (OUTPATIENT)
Dept: OBGYN CLINIC | Facility: CLINIC | Age: 37
End: 2017-03-16

## 2017-03-16 VITALS
HEART RATE: 77 BPM | RESPIRATION RATE: 17 BRPM | OXYGEN SATURATION: 100 % | SYSTOLIC BLOOD PRESSURE: 128 MMHG | HEIGHT: 60 IN | BODY MASS INDEX: 29.06 KG/M2 | TEMPERATURE: 98 F | WEIGHT: 148 LBS | DIASTOLIC BLOOD PRESSURE: 76 MMHG

## 2017-03-16 RX ORDER — BREAST PUMP
EACH MISCELLANEOUS
Qty: 1 EACH | Refills: 0 | OUTPATIENT
Start: 2017-03-16 | End: 2017-07-11

## 2017-03-16 RX ORDER — HYDROCODONE BITARTRATE AND ACETAMINOPHEN 5; 325 MG/1; MG/1
1 TABLET ORAL EVERY 4 HOURS PRN
Qty: 40 TABLET | Refills: 0 | Status: SHIPPED | OUTPATIENT
Start: 2017-03-16 | End: 2017-07-11

## 2017-03-16 NOTE — PROGRESS NOTES
PPD # 2. Doing well and ready to go home. Has rash where tape from epidural was. Voiding well. . Bleeding decreasing.  + Flatus. No BM. Son, Meggna Fong, transferred to NICU due to feeding issues.     /76 mmHg  Pulse 77  Temp(Src) 98 °F (36.7 °C) (Oral

## 2017-03-18 ENCOUNTER — TELEPHONE (OUTPATIENT)
Dept: OBGYN UNIT | Facility: HOSPITAL | Age: 37
End: 2017-03-18

## 2017-03-18 NOTE — PROGRESS NOTES
Reviewed selfcare w / mom, she verbalizes understanding of instructions reviewed. Encourage to follow up w/ MDs as directed and w/ questions/concerns. Baby remains in the nicu and mom visits daily. Pt denies any HTN issues during pregnancy or after delivery.

## 2017-03-18 NOTE — PROGRESS NOTES
Reviewed self and infant care w / mom, she verbalizes understanding of instructions reviewed. Encourage to follow up w/ MDs as directed and w/ questions/concerns.

## 2017-03-20 ENCOUNTER — TELEPHONE (OUTPATIENT)
Dept: OBGYN CLINIC | Facility: CLINIC | Age: 37
End: 2017-03-20

## 2017-03-23 ENCOUNTER — TELEPHONE (OUTPATIENT)
Dept: OBGYN CLINIC | Facility: CLINIC | Age: 37
End: 2017-03-23

## 2017-03-23 RX ORDER — CEFUROXIME AXETIL 500 MG/1
500 TABLET ORAL 2 TIMES DAILY
Qty: 20 TABLET | Refills: 0 | Status: SHIPPED | OUTPATIENT
Start: 2017-03-23 | End: 2017-04-02

## 2017-03-23 NOTE — TELEPHONE ENCOUNTER
Breast with red streak, painful. She thinks she has mastitis.   Baby coming home today from NICU  Ceftin 500 mg bid x 10 days

## 2017-03-23 NOTE — TELEPHONE ENCOUNTER
Patient believes she has mastitis. Can we call something in to her listed pharmacy or does she need to come in? Please call.

## 2017-03-23 NOTE — TELEPHONE ENCOUNTER
Right breast is painful  Milk supply decrease   She is seeing lactation.   Dr. Chelly cross for antibiotic treatment for patient  Patient has an allergy to tetracycline

## 2017-03-27 ENCOUNTER — NURSE ONLY (OUTPATIENT)
Dept: LACTATION | Facility: HOSPITAL | Age: 37
End: 2017-03-27
Attending: OBSTETRICS & GYNECOLOGY
Payer: COMMERCIAL

## 2017-03-27 ENCOUNTER — TELEPHONE (OUTPATIENT)
Dept: OBGYN CLINIC | Facility: CLINIC | Age: 37
End: 2017-03-27

## 2017-03-27 VITALS — TEMPERATURE: 98 F

## 2017-03-27 DIAGNOSIS — O92.5 SUPPRESSED LACTATION, POSTPARTUM CONDITION OR COMPLICATION: ICD-10-CM

## 2017-03-27 DIAGNOSIS — O92.29 SORE NIPPLES DUE TO LACTATION: ICD-10-CM

## 2017-03-27 PROCEDURE — 99213 OFFICE O/P EST LOW 20 MIN: CPT

## 2017-03-27 NOTE — PATIENT INSTRUCTIONS
Guidelines for Using a Nipple Shield    Refer to the [employer]. These are additional suggestions only.   • This thin silicone nipple shield (size 20mm) has been recommended to assist your baby to latch on to the breast or for protection of baby to your breast without the shield. • When your baby’s swallowing slows on the first side, repeat this process on the other breast.   • If needed, trickle drops of your expressed milk or formula onto the nipple to encourage your baby to latch on.  If y care provider are necessary when using the shield. • Your baby’s weight should be checked 1-2 times each week while using a nipple shield.         Breastfeeding suggestions when supplements are needed    [place] mother care: Snuggle your baby between you bottom of mouth. · Tip the bottle up just far enough that there is not air in the nipple. · Pausing mimics breastfeeding and discourages \"guzzling\" the feeding, allowing infant to take at least 15 minutes to drink the breastmilk or formula.      Milk Luque your baby's lips to encourage licking. · Point your nipple to baby's nose  · Stroke nipple lightly down center of lips  · Wait for wide mouth with tongue cupped at bottom of mouth.   · Chin should be deep into breast, with some room between nose and the br pump    Review of your history and treatment of any medical conditions by your physician is also necessary. [place] mother care: Snuggle with your baby in skin to skin contact. This helps to wake a sleepy baby and increases your milk supply.      [place] contact the lactation department or your doctor. Ways to help your milk let down (flow) to the pump:   • Massage your breasts for a few minutes prior to pumping and massage again if the milk flow slows down during the pumping session.    • Hand expressio be deep into breast, with some room between nose and the breast.   • If needed, gently draw chin down lower to deepen latch. • Compressing the breast when your baby sucks can increase milk flow.   • Swallowing with most sucks (every 1-3 sucks) until satisf

## 2017-03-30 ENCOUNTER — APPOINTMENT (OUTPATIENT)
Dept: LACTATION | Facility: HOSPITAL | Age: 37
End: 2017-03-30
Attending: OBSTETRICS & GYNECOLOGY
Payer: COMMERCIAL

## 2017-04-11 ENCOUNTER — OFFICE VISIT (OUTPATIENT)
Dept: OBGYN CLINIC | Facility: CLINIC | Age: 37
End: 2017-04-11

## 2017-04-11 ENCOUNTER — NURSE ONLY (OUTPATIENT)
Dept: ALLERGY | Facility: CLINIC | Age: 37
End: 2017-04-11

## 2017-04-11 VITALS — DIASTOLIC BLOOD PRESSURE: 74 MMHG | BODY MASS INDEX: 26 KG/M2 | SYSTOLIC BLOOD PRESSURE: 132 MMHG | WEIGHT: 135 LBS

## 2017-04-11 DIAGNOSIS — J30.89 ENVIRONMENTAL AND SEASONAL ALLERGIES: Primary | ICD-10-CM

## 2017-04-11 PROBLEM — O28.8 AMNIOTIC FLUID INDEX BORDERLINE LOW: Status: RESOLVED | Noted: 2017-03-03 | Resolved: 2017-04-11

## 2017-04-11 PROBLEM — O36.5990 IUGR, ANTENATAL: Status: RESOLVED | Noted: 2017-02-24 | Resolved: 2017-04-11

## 2017-04-11 PROBLEM — O35.HXX0 SHORT FEMUR OF FETUS ON PRENATAL ULTRASOUND (HCC): Status: RESOLVED | Noted: 2017-01-06 | Resolved: 2017-04-11

## 2017-04-11 PROBLEM — O35.HXX0 SHORT FEMUR OF FETUS ON PRENATAL ULTRASOUND: Status: RESOLVED | Noted: 2017-01-06 | Resolved: 2017-04-11

## 2017-04-11 PROBLEM — Z34.90 PREGNANCY (HCC): Status: RESOLVED | Noted: 2017-02-24 | Resolved: 2017-04-11

## 2017-04-11 PROBLEM — O35.8XX0 SHORT FEMUR OF FETUS ON PRENATAL ULTRASOUND: Status: RESOLVED | Noted: 2017-01-06 | Resolved: 2017-04-11

## 2017-04-11 PROBLEM — Z34.90 PREGNANCY: Status: RESOLVED | Noted: 2017-02-24 | Resolved: 2017-04-11

## 2017-04-11 PROBLEM — O36.5990 IUGR, ANTENATAL (HCC): Status: RESOLVED | Noted: 2017-02-24 | Resolved: 2017-04-11

## 2017-04-11 PROCEDURE — 95117 IMMUNOTHERAPY INJECTIONS: CPT | Performed by: ALLERGY & IMMUNOLOGY

## 2017-04-11 RX ORDER — METOCLOPRAMIDE 10 MG/1
10 TABLET ORAL EVERY 6 HOURS PRN
Qty: 40 TABLET | Refills: 0 | Status: SHIPPED | OUTPATIENT
Start: 2017-04-11 | End: 2017-07-11

## 2017-04-11 RX ORDER — ZOLPIDEM TARTRATE 10 MG/1
10 TABLET ORAL NIGHTLY PRN
Qty: 20 TABLET | Refills: 0 | Status: SHIPPED | OUTPATIENT
Start: 2017-04-11 | End: 2017-07-11

## 2017-04-11 NOTE — PROGRESS NOTES
PP  She has emotional issues when she is tired  Hx of anxiety, does have some help  [facility] referral discussed, number given    ROS: No Cardiac, Respiratory, GI,  or Neurological symptoms.     PE:  Abdomen soft  Pelvic:External vag normal, cervix wit

## 2017-04-27 RX ORDER — METOCLOPRAMIDE 10 MG/1
TABLET ORAL
Qty: 40 TABLET | Refills: 0 | OUTPATIENT
Start: 2017-04-27

## 2017-05-02 ENCOUNTER — NURSE ONLY (OUTPATIENT)
Dept: ALLERGY | Facility: CLINIC | Age: 37
End: 2017-05-02

## 2017-05-02 DIAGNOSIS — J30.89 ENVIRONMENTAL AND SEASONAL ALLERGIES: Primary | ICD-10-CM

## 2017-05-02 PROCEDURE — 95117 IMMUNOTHERAPY INJECTIONS: CPT | Performed by: ALLERGY & IMMUNOLOGY

## 2017-06-05 ENCOUNTER — NURSE ONLY (OUTPATIENT)
Dept: ALLERGY | Facility: CLINIC | Age: 37
End: 2017-06-05

## 2017-06-05 DIAGNOSIS — J30.89 ENVIRONMENTAL AND SEASONAL ALLERGIES: Primary | ICD-10-CM

## 2017-06-05 PROCEDURE — 95117 IMMUNOTHERAPY INJECTIONS: CPT | Performed by: ALLERGY & IMMUNOLOGY

## 2017-06-05 PROCEDURE — 95165 ANTIGEN THERAPY SERVICES: CPT | Performed by: ALLERGY & IMMUNOLOGY

## 2017-07-10 ENCOUNTER — NURSE ONLY (OUTPATIENT)
Dept: ALLERGY | Facility: CLINIC | Age: 37
End: 2017-07-10

## 2017-07-10 DIAGNOSIS — J30.89 ENVIRONMENTAL AND SEASONAL ALLERGIES: ICD-10-CM

## 2017-07-10 PROCEDURE — 95117 IMMUNOTHERAPY INJECTIONS: CPT | Performed by: ALLERGY & IMMUNOLOGY

## 2017-07-10 PROCEDURE — 95165 ANTIGEN THERAPY SERVICES: CPT | Performed by: ALLERGY & IMMUNOLOGY

## 2017-07-11 ENCOUNTER — OFFICE VISIT (OUTPATIENT)
Dept: FAMILY MEDICINE CLINIC | Facility: CLINIC | Age: 37
End: 2017-07-11

## 2017-07-11 VITALS
SYSTOLIC BLOOD PRESSURE: 112 MMHG | RESPIRATION RATE: 16 BRPM | BODY MASS INDEX: 26.31 KG/M2 | HEART RATE: 60 BPM | DIASTOLIC BLOOD PRESSURE: 76 MMHG | WEIGHT: 134 LBS | TEMPERATURE: 98 F | HEIGHT: 60 IN

## 2017-07-11 DIAGNOSIS — M25.512 ACUTE PAIN OF LEFT SHOULDER: Primary | ICD-10-CM

## 2017-07-11 PROCEDURE — 99213 OFFICE O/P EST LOW 20 MIN: CPT | Performed by: FAMILY MEDICINE

## 2017-07-11 RX ORDER — HYDROCODONE BITARTRATE AND ACETAMINOPHEN 5; 325 MG/1; MG/1
1 TABLET ORAL EVERY 4 HOURS PRN
Qty: 30 TABLET | Refills: 1 | Status: SHIPPED | OUTPATIENT
Start: 2017-07-11 | End: 2018-02-08

## 2017-07-12 NOTE — PROGRESS NOTES
HPI:    Patient ID: Ramon Vences is a 42year old female. Shoulder Pain    Pain location: b/l shoulder, worse on left side. This is a new problem. The current episode started in the past 7 days. History of extremity trauma: lifting.  The problem occurs Acute pain of left shoulder  (primary encounter diagnosis)  1. Acute pain of left shoulder  Pt states she has done 1 yr of PT on her shoulder and she will do it herself.  - HYDROcodone-acetaminophen 5-325 MG Oral Tab;  Take 1 tablet by mouth every 4 (four

## 2017-07-31 ENCOUNTER — TELEPHONE (OUTPATIENT)
Dept: ALLERGY | Facility: CLINIC | Age: 37
End: 2017-07-31

## 2017-07-31 ENCOUNTER — NURSE ONLY (OUTPATIENT)
Dept: ALLERGY | Facility: CLINIC | Age: 37
End: 2017-07-31

## 2017-07-31 DIAGNOSIS — J30.89 ENVIRONMENTAL AND SEASONAL ALLERGIES: ICD-10-CM

## 2017-07-31 PROCEDURE — 95117 IMMUNOTHERAPY INJECTIONS: CPT | Performed by: ALLERGY & IMMUNOLOGY

## 2017-07-31 NOTE — TELEPHONE ENCOUNTER
Pt requesting a refill on her Qnasl at her AIT visit. Pt is due for an office visit at this time. Pt was told, Please advise.

## 2017-07-31 NOTE — TELEPHONE ENCOUNTER
Pharmacy stts insurance will not cover, is suggesting Iram Browne. Please advise. Beclomethasone Dipropionate (QNASL) 80 MCG/ACT Nasal Aero Soln 1 Inhaler 5 7/31/2017 7/31/2017   Sig :  2 Squirts by Nasal route one time.      Route: [facility]     Order #:

## 2017-08-29 ENCOUNTER — TELEPHONE (OUTPATIENT)
Dept: FAMILY MEDICINE CLINIC | Facility: CLINIC | Age: 37
End: 2017-08-29

## 2017-08-30 ENCOUNTER — NURSE ONLY (OUTPATIENT)
Dept: ALLERGY | Facility: CLINIC | Age: 37
End: 2017-08-30

## 2017-08-30 DIAGNOSIS — J30.89 ENVIRONMENTAL AND SEASONAL ALLERGIES: ICD-10-CM

## 2017-08-30 PROCEDURE — 95117 IMMUNOTHERAPY INJECTIONS: CPT | Performed by: ALLERGY & IMMUNOLOGY

## 2017-08-31 ENCOUNTER — LABORATORY ENCOUNTER (OUTPATIENT)
Dept: LAB | Age: 37
End: 2017-08-31
Attending: INTERNAL MEDICINE
Payer: COMMERCIAL

## 2017-08-31 ENCOUNTER — OFFICE VISIT (OUTPATIENT)
Dept: FAMILY MEDICINE CLINIC | Facility: CLINIC | Age: 37
End: 2017-08-31

## 2017-08-31 VITALS
TEMPERATURE: 98 F | RESPIRATION RATE: 20 BRPM | HEART RATE: 74 BPM | OXYGEN SATURATION: 98 % | BODY MASS INDEX: 25.13 KG/M2 | WEIGHT: 128 LBS | HEIGHT: 60 IN | SYSTOLIC BLOOD PRESSURE: 108 MMHG | DIASTOLIC BLOOD PRESSURE: 64 MMHG

## 2017-08-31 DIAGNOSIS — R53.83 FATIGUE, UNSPECIFIED TYPE: ICD-10-CM

## 2017-08-31 DIAGNOSIS — Z01.419 WELL WOMAN EXAM: ICD-10-CM

## 2017-08-31 DIAGNOSIS — Z01.419 WELL WOMAN EXAM: Primary | ICD-10-CM

## 2017-08-31 LAB
25-HYDROXYVITAMIN D (TOTAL): 45.6 NG/ML (ref 30–100)
ALBUMIN SERPL-MCNC: 3.7 G/DL (ref 3.5–4.8)
ALP LIVER SERPL-CCNC: 60 U/L (ref 37–98)
ALT SERPL-CCNC: 24 U/L (ref 14–54)
ANTI-THYROGLOBULIN: <10 U/ML (ref ?–60)
ANTI-THYROPEROXIDASE: <15 U/ML (ref ?–60)
AST SERPL-CCNC: 11 U/L (ref 15–41)
BASOPHILS # BLD AUTO: 0.04 X10(3) UL (ref 0–0.1)
BASOPHILS NFR BLD AUTO: 0.5 %
BILIRUB SERPL-MCNC: 0.5 MG/DL (ref 0.1–2)
BUN BLD-MCNC: 17 MG/DL (ref 8–20)
CALCIUM BLD-MCNC: 9 MG/DL (ref 8.3–10.3)
CHLORIDE: 109 MMOL/L (ref 101–111)
CHOLEST SMN-MCNC: 147 MG/DL (ref ?–200)
CO2: 25 MMOL/L (ref 22–32)
CREAT BLD-MCNC: 0.75 MG/DL (ref 0.55–1.02)
EOSINOPHIL # BLD AUTO: 0.07 X10(3) UL (ref 0–0.3)
EOSINOPHIL NFR BLD AUTO: 0.8 %
ERYTHROCYTE [DISTWIDTH] IN BLOOD BY AUTOMATED COUNT: 13.2 % (ref 11.5–16)
FREE T4: 0.8 NG/DL (ref 0.9–1.8)
GLUCOSE BLD-MCNC: 81 MG/DL (ref 70–99)
HAV AB SERPL IA-ACNC: 545 PG/ML (ref 193–986)
HCT VFR BLD AUTO: 43 % (ref 34–50)
HDLC SERPL-MCNC: 91 MG/DL (ref 45–?)
HDLC SERPL: 1.62 {RATIO} (ref ?–4.44)
HGB BLD-MCNC: 14.3 G/DL (ref 12–16)
IMMATURE GRANULOCYTE COUNT: 0.03 X10(3) UL (ref 0–1)
IMMATURE GRANULOCYTE RATIO %: 0.3 %
LDLC SERPL CALC-MCNC: 44 MG/DL (ref ?–130)
LDLC SERPL-MCNC: 12 MG/DL (ref 5–40)
LYMPHOCYTES # BLD AUTO: 1.61 X10(3) UL (ref 0.9–4)
LYMPHOCYTES NFR BLD AUTO: 18.5 %
M PROTEIN MFR SERPL ELPH: 7.5 G/DL (ref 6.1–8.3)
MCH RBC QN AUTO: 29.9 PG (ref 27–33.2)
MCHC RBC AUTO-ENTMCNC: 33.3 G/DL (ref 31–37)
MCV RBC AUTO: 90 FL (ref 81–100)
MONOCYTES # BLD AUTO: 0.56 X10(3) UL (ref 0.1–0.6)
MONOCYTES NFR BLD AUTO: 6.4 %
NEUTROPHIL ABS PRELIM: 6.39 X10 (3) UL (ref 1.3–6.7)
NEUTROPHILS # BLD AUTO: 6.39 X10(3) UL (ref 1.3–6.7)
NEUTROPHILS NFR BLD AUTO: 73.5 %
NONHDLC SERPL-MCNC: 56 MG/DL (ref ?–130)
PLATELET # BLD AUTO: 270 10(3)UL (ref 150–450)
POTASSIUM SERPL-SCNC: 3.9 MMOL/L (ref 3.6–5.1)
RBC # BLD AUTO: 4.78 X10(6)UL (ref 3.8–5.1)
RED CELL DISTRIBUTION WIDTH-SD: 43.5 FL (ref 35.1–46.3)
SODIUM SERPL-SCNC: 140 MMOL/L (ref 136–144)
TRIGLYCERIDES: 62 MG/DL (ref ?–150)
TSI SER-ACNC: 1.61 MIU/ML (ref 0.35–5.5)
WBC # BLD AUTO: 8.7 X10(3) UL (ref 4–13)

## 2017-08-31 PROCEDURE — 84443 ASSAY THYROID STIM HORMONE: CPT

## 2017-08-31 PROCEDURE — 85025 COMPLETE CBC W/AUTO DIFF WBC: CPT

## 2017-08-31 PROCEDURE — 84439 ASSAY OF FREE THYROXINE: CPT

## 2017-08-31 PROCEDURE — 80061 LIPID PANEL: CPT

## 2017-08-31 PROCEDURE — 86376 MICROSOMAL ANTIBODY EACH: CPT

## 2017-08-31 PROCEDURE — 99395 PREV VISIT EST AGE 18-39: CPT | Performed by: INTERNAL MEDICINE

## 2017-08-31 PROCEDURE — 86800 THYROGLOBULIN ANTIBODY: CPT

## 2017-08-31 PROCEDURE — 82306 VITAMIN D 25 HYDROXY: CPT

## 2017-08-31 PROCEDURE — 36415 COLL VENOUS BLD VENIPUNCTURE: CPT

## 2017-08-31 PROCEDURE — 82607 VITAMIN B-12: CPT

## 2017-08-31 PROCEDURE — 80053 COMPREHEN METABOLIC PANEL: CPT

## 2017-08-31 RX ORDER — BECLOMETHASONE DIPROPIONATE 80 UG/1
AEROSOL, METERED NASAL
Refills: 5 | COMMUNITY
Start: 2017-08-22 | End: 2018-08-14

## 2017-08-31 RX ORDER — ZOLPIDEM TARTRATE 10 MG/1
10 TABLET ORAL NIGHTLY PRN
Qty: 30 TABLET | Refills: 1 | Status: SHIPPED | OUTPATIENT
Start: 2017-08-31 | End: 2018-01-29

## 2017-08-31 NOTE — PROGRESS NOTES
HPI:   Kaylah Almeida is a 42year old female who presents for a well woman exam. Symptoms: denies discharge, itching, burning or dysuria, periods are regular, flow is 5 days. Patient's last menstrual period was 08/08/2017.   Previous pap: 2016 normal  Pe Smoking status: Never Smoker                                                              Smokeless tobacco: Never Used                      Alcohol use: Yes           2.4 oz/week     Standard drinks or equivalent: 4 per week     Comment: when tiffanyot pregn intact, LEs +2 DTRs. ASSESSMENT AND PLAN:   1. Well woman exam  Reinforced routine SBEs. Discussed the benefits of routine exercise, a heart healthy diet, adequate calcium intake, and annual flu vaccines. - COMP METABOLIC PANEL (14);  Future  - LIPID

## 2017-08-31 NOTE — PATIENT INSTRUCTIONS
LAB HOURS & LOCATIONS        Alessandra  [facility])    [address] [facility]   [address] [place] [address]     ([address])         [address] [place], [place] [address]    Mon-Fri  5am-8pm     Mon-Fri   7am-4pm  Sat         6am-3pm     Sat          7am-3pm      Itz Perry

## 2017-09-26 ENCOUNTER — LAB ENCOUNTER (OUTPATIENT)
Dept: LAB | Age: 37
End: 2017-09-26
Attending: FAMILY MEDICINE
Payer: COMMERCIAL

## 2017-09-26 ENCOUNTER — TELEPHONE (OUTPATIENT)
Dept: FAMILY MEDICINE CLINIC | Facility: CLINIC | Age: 37
End: 2017-09-26

## 2017-09-26 DIAGNOSIS — E83.00 DISORDER OF COPPER METABOLISM: Primary | ICD-10-CM

## 2017-09-26 DIAGNOSIS — E83.2 DISORDER OF ZINC METABOLISM: ICD-10-CM

## 2017-09-26 DIAGNOSIS — Z00.00 ROUTINE GENERAL MEDICAL EXAMINATION AT A HEALTH CARE FACILITY: Primary | ICD-10-CM

## 2017-09-26 DIAGNOSIS — E72.10 DISORDER OF TRANSSULFURATION (HCC): ICD-10-CM

## 2017-09-26 LAB — HAV IGM SER QL: 2.2 MG/DL (ref 1.7–3)

## 2017-09-26 PROCEDURE — 83735 ASSAY OF MAGNESIUM: CPT

## 2017-09-26 PROCEDURE — 82525 ASSAY OF COPPER: CPT

## 2017-09-26 PROCEDURE — 84630 ASSAY OF ZINC: CPT

## 2017-09-26 PROCEDURE — 82139 AMINO ACIDS QUAN 6 OR MORE: CPT

## 2017-09-26 PROCEDURE — 36415 COLL VENOUS BLD VENIPUNCTURE: CPT

## 2017-09-28 LAB
COPPER, SERUM: 111 UG/DL
ZINC: 59 UG/DL

## 2017-09-29 LAB
A-AMINO ADIPIC ACID, PLASMA: 2 UMOL/L
A-AMINO BUTYRIC ACID, PLASMA: 23 UMOL/L
ALANINE, PLASMA: 233 UMOL/L
ALLO-ISOLEUCINE, PLASMA: 1 UMOL/L
AMINO ACIDS, PL INTERPRETATION: NORMAL
ANSERINE, PLASMA: NOT DETECTED UMOL/L
ARGININE, PLASMA: 59 UMOL/L
ARGININOSUCCINIC ACID, PLASMA: NOT DETECTED UMOL/L
ASPARAGINE, PLASMA: 39 UMOL/L
ASPARTIC ACID, PLASMA: 4 UMOL/L
B-ALANINE, PLASMA: 3 UMOL/L
B-AMINO ISOBUTYRIC ACID, PLASMA: 0 UMOL/L
CITRULLINE, PLASMA: 27 UMOL/L
CYSTATHIONINE, PLASMA: NOT DETECTED UMOL/L
CYSTINE, PLASMA: 28 UMOL/L
ETHANOLAMINE, PLASMA: 6 UMOL/L
G-AMINO BUTYRIC ACID, PLASMA: NOT DETECTED UMOL/L
GLUTAMIC ACID, PLASMA: 31 UMOL/L
GLUTAMINE, PLASMA: 360 UMOL/L
GLYCINE, PLASMA: 125 UMOL/L
HISTIDINE, PLASMA: 109 UMOL/L
HOMOCITRULLINE, PLASMA: 1 UMOL/L
HOMOCYSTINE, PLASMA: NOT DETECTED UMOL/L
HYDROXYLYSINE, PLASMA: 0 UMOL/L
HYDROXYPROLINE, PLASMA: 12 UMOL/L
ISOLEUCINE, PLASMA: 108 UMOL/L
LEUCINE, PLASMA: 156 UMOL/L
LYSINE, PLASMA: 194 UMOL/L
METHIONINE, PLASMA: 27 UMOL/L
ORNITHINE, PLASMA: 53 UMOL/L
PHENYLALANINE, PLASMA: 73 UMOL/L
PROLINE, PLASMA: 108 UMOL/L
SARCOSINE, PLASMA: 0 UMOL/L
SERINE, PLASMA: 78 UMOL/L
TAURINE, PLASMA: 53 UMOL/L
THREONINE, PLASMA: 98 UMOL/L
TRYPTOPHAN, PLASMA: 74 UMOL/L
TYROSINE, PLASMA: 73 UMOL/L
VALINE, PLASMA: 350 UMOL/L

## 2017-10-04 ENCOUNTER — NURSE ONLY (OUTPATIENT)
Dept: ALLERGY | Facility: CLINIC | Age: 37
End: 2017-10-04

## 2017-10-04 DIAGNOSIS — J30.89 ENVIRONMENTAL AND SEASONAL ALLERGIES: ICD-10-CM

## 2017-10-04 PROCEDURE — 95117 IMMUNOTHERAPY INJECTIONS: CPT | Performed by: ALLERGY & IMMUNOLOGY

## 2017-10-04 PROCEDURE — 95165 ANTIGEN THERAPY SERVICES: CPT | Performed by: ALLERGY & IMMUNOLOGY

## 2017-10-25 ENCOUNTER — TELEPHONE (OUTPATIENT)
Dept: FAMILY MEDICINE CLINIC | Facility: CLINIC | Age: 37
End: 2017-10-25

## 2017-10-26 NOTE — TELEPHONE ENCOUNTER
Wellness form filled out and signed. Faxed to insurance at [phone] as requested. Copy sent to scan and copy placed in patient form folder. Task complete.

## 2017-10-27 ENCOUNTER — TELEPHONE (OUTPATIENT)
Dept: FAMILY MEDICINE CLINIC | Facility: CLINIC | Age: 37
End: 2017-10-27

## 2017-10-27 RX ORDER — HYDROCODONE BITARTRATE AND ACETAMINOPHEN 5; 325 MG/1; MG/1
1 TABLET ORAL EVERY 4 HOURS PRN
Qty: 20 TABLET | Refills: 0 | Status: SHIPPED | OUTPATIENT
Start: 2017-10-27 | End: 2018-02-08

## 2017-10-27 NOTE — TELEPHONE ENCOUNTER
Pt called to check on the status of her new prescription request for her norco medication. Please call and advise.

## 2017-11-08 ENCOUNTER — NURSE ONLY (OUTPATIENT)
Dept: ALLERGY | Facility: CLINIC | Age: 37
End: 2017-11-08

## 2017-11-08 DIAGNOSIS — J30.89 ENVIRONMENTAL AND SEASONAL ALLERGIES: ICD-10-CM

## 2017-11-08 PROCEDURE — 95165 ANTIGEN THERAPY SERVICES: CPT | Performed by: ALLERGY & IMMUNOLOGY

## 2017-11-08 PROCEDURE — 95117 IMMUNOTHERAPY INJECTIONS: CPT | Performed by: ALLERGY & IMMUNOLOGY

## 2017-11-22 ENCOUNTER — NURSE ONLY (OUTPATIENT)
Dept: ALLERGY | Facility: CLINIC | Age: 37
End: 2017-11-22

## 2017-11-22 DIAGNOSIS — J30.9 ALLERGIC RHINITIS, UNSPECIFIED CHRONICITY, UNSPECIFIED SEASONALITY, UNSPECIFIED TRIGGER: ICD-10-CM

## 2017-11-22 PROCEDURE — 95117 IMMUNOTHERAPY INJECTIONS: CPT | Performed by: ALLERGY & IMMUNOLOGY

## 2017-12-06 ENCOUNTER — NURSE ONLY (OUTPATIENT)
Dept: ALLERGY | Facility: CLINIC | Age: 37
End: 2017-12-06

## 2017-12-06 DIAGNOSIS — J30.89 ENVIRONMENTAL AND SEASONAL ALLERGIES: ICD-10-CM

## 2017-12-06 PROCEDURE — 95117 IMMUNOTHERAPY INJECTIONS: CPT | Performed by: ALLERGY & IMMUNOLOGY

## 2017-12-11 ENCOUNTER — TELEPHONE (OUTPATIENT)
Dept: ALLERGY | Facility: CLINIC | Age: 37
End: 2017-12-11

## 2017-12-11 RX ORDER — AZELASTINE 1 MG/ML
SPRAY, METERED NASAL
Qty: 1 BOTTLE | Refills: 0 | Status: SHIPPED | OUTPATIENT
Start: 2017-12-11 | End: 2018-03-06

## 2017-12-12 NOTE — TELEPHONE ENCOUNTER
Call reviewed and noted. Prescription for Astelin 1-2 sprays per nostril twice a day spends 1 bottle sent to patient's pharmacy.

## 2017-12-12 NOTE — TELEPHONE ENCOUNTER
Pt is requesting a trial of Azelestine nasal spray. Pt cortisol levels are rising in the evenings, so she would like to try a non steroidal nasal spray. Forwarded to Dr. Rivka Higgins for review.

## 2017-12-20 ENCOUNTER — NURSE ONLY (OUTPATIENT)
Dept: ALLERGY | Facility: CLINIC | Age: 37
End: 2017-12-20

## 2017-12-20 DIAGNOSIS — J30.89 ENVIRONMENTAL AND SEASONAL ALLERGIES: ICD-10-CM

## 2017-12-20 PROCEDURE — 95165 ANTIGEN THERAPY SERVICES: CPT | Performed by: ALLERGY & IMMUNOLOGY

## 2017-12-20 PROCEDURE — 95117 IMMUNOTHERAPY INJECTIONS: CPT | Performed by: ALLERGY & IMMUNOLOGY

## 2018-01-03 ENCOUNTER — NURSE ONLY (OUTPATIENT)
Dept: ALLERGY | Facility: CLINIC | Age: 38
End: 2018-01-03

## 2018-01-03 DIAGNOSIS — J30.9 ALLERGIC RHINITIS, UNSPECIFIED CHRONICITY, UNSPECIFIED SEASONALITY, UNSPECIFIED TRIGGER: ICD-10-CM

## 2018-01-03 PROCEDURE — 95165 ANTIGEN THERAPY SERVICES: CPT | Performed by: ALLERGY & IMMUNOLOGY

## 2018-01-03 PROCEDURE — 95117 IMMUNOTHERAPY INJECTIONS: CPT | Performed by: ALLERGY & IMMUNOLOGY

## 2018-01-09 ENCOUNTER — OFFICE VISIT (OUTPATIENT)
Dept: OTOLARYNGOLOGY | Facility: CLINIC | Age: 38
End: 2018-01-09

## 2018-01-09 VITALS
DIASTOLIC BLOOD PRESSURE: 78 MMHG | BODY MASS INDEX: 24.15 KG/M2 | WEIGHT: 123 LBS | HEIGHT: 60 IN | SYSTOLIC BLOOD PRESSURE: 139 MMHG | TEMPERATURE: 98 F

## 2018-01-09 DIAGNOSIS — J32.9 CHRONIC SINUSITIS, UNSPECIFIED LOCATION: Primary | ICD-10-CM

## 2018-01-09 PROCEDURE — 99212 OFFICE O/P EST SF 10 MIN: CPT | Performed by: OTOLARYNGOLOGY

## 2018-01-09 PROCEDURE — 99243 OFF/OP CNSLTJ NEW/EST LOW 30: CPT | Performed by: OTOLARYNGOLOGY

## 2018-01-09 NOTE — PROGRESS NOTES
Smiley May is a 42year old female. Patient presents with:  Nose Problem: nasal congestion for 6 months    HPI:   She's had problems with constant nasal congestion and pressure for several years.  She's been receiving immunotherapy for the last 2 years w 5' (1.524 m)   Wt 123 lb (55.8 kg)   BMI 24.02 kg/m²   System Findings Details   Skin Normal Inspection - Normal.   Constitutional Normal Overall appearance - Normal.   Head/Face Normal Facial features - Normal. Eyebrows - Normal. Skull - Normal.   Oral/Or

## 2018-01-16 ENCOUNTER — HOSPITAL ENCOUNTER (OUTPATIENT)
Dept: CT IMAGING | Age: 38
Discharge: HOME | End: 2018-01-16
Attending: OTOLARYNGOLOGY
Payer: COMMERCIAL

## 2018-01-16 DIAGNOSIS — J32.9 CHRONIC SINUSITIS, UNSPECIFIED LOCATION: ICD-10-CM

## 2018-01-16 PROCEDURE — 70486 CT MAXILLOFACIAL W/O DYE: CPT | Performed by: OTOLARYNGOLOGY

## 2018-01-17 ENCOUNTER — NURSE ONLY (OUTPATIENT)
Dept: ALLERGY | Facility: CLINIC | Age: 38
End: 2018-01-17

## 2018-01-17 DIAGNOSIS — J30.89 ENVIRONMENTAL AND SEASONAL ALLERGIES: ICD-10-CM

## 2018-01-17 PROCEDURE — 95117 IMMUNOTHERAPY INJECTIONS: CPT | Performed by: ALLERGY & IMMUNOLOGY

## 2018-01-18 ENCOUNTER — TELEPHONE (OUTPATIENT)
Dept: OTOLARYNGOLOGY | Facility: CLINIC | Age: 38
End: 2018-01-18

## 2018-01-18 NOTE — TELEPHONE ENCOUNTER
Pt informed of results of CT and recommendations per ; pt verbalized understanding, scheduled f/u appt for .

## 2018-01-19 ENCOUNTER — TELEPHONE (OUTPATIENT)
Dept: OTOLARYNGOLOGY | Facility: CLINIC | Age: 38
End: 2018-01-19

## 2018-01-19 ENCOUNTER — OFFICE VISIT (OUTPATIENT)
Dept: OTOLARYNGOLOGY | Facility: CLINIC | Age: 38
End: 2018-01-19

## 2018-01-19 VITALS
BODY MASS INDEX: 24.15 KG/M2 | WEIGHT: 123 LBS | HEIGHT: 60 IN | SYSTOLIC BLOOD PRESSURE: 121 MMHG | DIASTOLIC BLOOD PRESSURE: 73 MMHG | TEMPERATURE: 99 F

## 2018-01-19 DIAGNOSIS — J34.2 DEVIATED SEPTUM: Primary | ICD-10-CM

## 2018-01-19 PROCEDURE — 99212 OFFICE O/P EST SF 10 MIN: CPT | Performed by: OTOLARYNGOLOGY

## 2018-01-19 PROCEDURE — 99213 OFFICE O/P EST LOW 20 MIN: CPT | Performed by: OTOLARYNGOLOGY

## 2018-01-19 NOTE — PROGRESS NOTES
Romina Maciel is a 42year old female. Patient presents with: Follow - Up: CT scan result done on 1/16/18    HPI:   She continues to have difficulty breathing through her nose with constant congestion.   She is continuing to get some relief with increased Wt 123 lb (55.8 kg)   BMI 24.02 kg/m²   System Findings Details   Skin Normal Inspection - Normal.   Constitutional Normal Overall appearance - Normal.   Head/Face Normal Facial features - Normal. Eyebrows - Normal. Skull - Normal.   Oral/Oropharynx Norm

## 2018-01-19 NOTE — TELEPHONE ENCOUNTER
Patient would like to know when  performs surgery. Is looking to coordinate a date. Please call. Thank you.

## 2018-01-29 RX ORDER — ZOLPIDEM TARTRATE 10 MG/1
10 TABLET ORAL NIGHTLY PRN
Qty: 30 TABLET | Refills: 1
Start: 2018-01-29 | End: 2018-08-14

## 2018-01-29 NOTE — TELEPHONE ENCOUNTER
From: Melony Budtoma  Sent: 1/29/2018 5:35 AM CST  Subject: Medication Renewal Request    Guera Joyner.  Megan Shabazz would like a refill of the following medications:     Zolpidem Tartrate 10 MG Oral Tab [Linda Dorado NP]    Preferred pharmacy: [facility] [id number] [place]

## 2018-01-30 ENCOUNTER — TELEPHONE (OUTPATIENT)
Dept: FAMILY MEDICINE CLINIC | Facility: CLINIC | Age: 38
End: 2018-01-30

## 2018-02-02 ENCOUNTER — TELEPHONE (OUTPATIENT)
Dept: OTOLARYNGOLOGY | Facility: CLINIC | Age: 38
End: 2018-02-02

## 2018-02-02 NOTE — TELEPHONE ENCOUNTER
Per pt, she is doing well. Drainage has subsided, no c/o pain at this time.     Pt advised no nose blowing, sneeze with mouth open, no heavy lifting or bending for up to two weeks post op; may use saline spray for congestion, Afrin for bleeding but for no m

## 2018-02-07 ENCOUNTER — NURSE ONLY (OUTPATIENT)
Dept: ALLERGY | Facility: CLINIC | Age: 38
End: 2018-02-07

## 2018-02-07 DIAGNOSIS — J30.89 ENVIRONMENTAL AND SEASONAL ALLERGIES: ICD-10-CM

## 2018-02-07 PROCEDURE — 95117 IMMUNOTHERAPY INJECTIONS: CPT | Performed by: ALLERGY & IMMUNOLOGY

## 2018-02-08 ENCOUNTER — OFFICE VISIT (OUTPATIENT)
Dept: OTOLARYNGOLOGY | Facility: CLINIC | Age: 38
End: 2018-02-08

## 2018-02-08 VITALS
WEIGHT: 123 LBS | SYSTOLIC BLOOD PRESSURE: 130 MMHG | TEMPERATURE: 97 F | HEIGHT: 60 IN | DIASTOLIC BLOOD PRESSURE: 74 MMHG | BODY MASS INDEX: 24.15 KG/M2

## 2018-02-08 DIAGNOSIS — J34.2 NASAL SEPTAL DEVIATION: ICD-10-CM

## 2018-02-08 PROCEDURE — 99212 OFFICE O/P EST SF 10 MIN: CPT | Performed by: OTOLARYNGOLOGY

## 2018-02-08 PROCEDURE — 99024 POSTOP FOLLOW-UP VISIT: CPT | Performed by: OTOLARYNGOLOGY

## 2018-02-08 RX ORDER — HYDROCODONE BITARTRATE AND ACETAMINOPHEN 5; 325 MG/1; MG/1
1 TABLET ORAL EVERY 4 HOURS PRN
Qty: 15 TABLET | Refills: 1 | Status: SHIPPED | OUTPATIENT
Start: 2018-02-08 | End: 2018-07-13

## 2018-02-08 RX ORDER — CEPHALEXIN 500 MG/1
CAPSULE ORAL
COMMUNITY
Start: 2018-02-01 | End: 2018-07-13

## 2018-02-08 NOTE — PROGRESS NOTES
She is still sore following her septoplasty. Exam:  Splints removed and airway cleared of debris. Airway patent bilaterally    /Plan:  Doing well following her surgery.   Continue nasal saline and return for any problems

## 2018-03-06 RX ORDER — AZELASTINE 1 MG/ML
SPRAY, METERED NASAL
Qty: 3 BOTTLE | Refills: 4 | Status: SHIPPED | OUTPATIENT
Start: 2018-03-06 | End: 2019-02-15

## 2018-03-06 RX ORDER — MONTELUKAST SODIUM 10 MG/1
10 TABLET ORAL EVERY EVENING
Qty: 90 TABLET | Refills: 4 | Status: SHIPPED | OUTPATIENT
Start: 2018-03-06 | End: 2018-12-28 | Stop reason: ALTCHOICE

## 2018-03-06 NOTE — TELEPHONE ENCOUNTER
Pt requesting montelukast and azelestine RX to be sent to [facility]. Medications pended in Meds & Orders. Dr. Livier Chow please review and sign. Thank you.

## 2018-03-07 ENCOUNTER — NURSE ONLY (OUTPATIENT)
Dept: ALLERGY | Facility: CLINIC | Age: 38
End: 2018-03-07

## 2018-03-07 DIAGNOSIS — J30.89 ENVIRONMENTAL AND SEASONAL ALLERGIES: ICD-10-CM

## 2018-03-07 PROCEDURE — 95165 ANTIGEN THERAPY SERVICES: CPT | Performed by: ALLERGY & IMMUNOLOGY

## 2018-03-07 PROCEDURE — 95117 IMMUNOTHERAPY INJECTIONS: CPT | Performed by: ALLERGY & IMMUNOLOGY

## 2018-04-07 ENCOUNTER — NURSE ONLY (OUTPATIENT)
Dept: ALLERGY | Facility: CLINIC | Age: 38
End: 2018-04-07

## 2018-04-07 DIAGNOSIS — J30.89 ENVIRONMENTAL AND SEASONAL ALLERGIES: ICD-10-CM

## 2018-04-07 PROCEDURE — 95117 IMMUNOTHERAPY INJECTIONS: CPT | Performed by: ALLERGY & IMMUNOLOGY

## 2018-04-07 PROCEDURE — 95165 ANTIGEN THERAPY SERVICES: CPT | Performed by: ALLERGY & IMMUNOLOGY

## 2018-04-21 ENCOUNTER — NURSE ONLY (OUTPATIENT)
Dept: ALLERGY | Facility: CLINIC | Age: 38
End: 2018-04-21

## 2018-04-21 DIAGNOSIS — J30.89 ENVIRONMENTAL AND SEASONAL ALLERGIES: ICD-10-CM

## 2018-04-21 PROCEDURE — 95117 IMMUNOTHERAPY INJECTIONS: CPT | Performed by: ALLERGY & IMMUNOLOGY

## 2018-05-19 ENCOUNTER — NURSE ONLY (OUTPATIENT)
Dept: ALLERGY | Facility: CLINIC | Age: 38
End: 2018-05-19

## 2018-05-19 DIAGNOSIS — J30.89 ENVIRONMENTAL AND SEASONAL ALLERGIES: ICD-10-CM

## 2018-05-19 PROCEDURE — 95117 IMMUNOTHERAPY INJECTIONS: CPT | Performed by: ALLERGY & IMMUNOLOGY

## 2018-06-11 ENCOUNTER — NURSE ONLY (OUTPATIENT)
Dept: ALLERGY | Facility: CLINIC | Age: 38
End: 2018-06-11

## 2018-06-11 DIAGNOSIS — J30.89 ENVIRONMENTAL AND SEASONAL ALLERGIES: ICD-10-CM

## 2018-06-11 PROCEDURE — 95117 IMMUNOTHERAPY INJECTIONS: CPT | Performed by: ALLERGY & IMMUNOLOGY

## 2018-06-11 PROCEDURE — 95165 ANTIGEN THERAPY SERVICES: CPT | Performed by: ALLERGY & IMMUNOLOGY

## 2018-07-13 PROBLEM — R53.83 FATIGUE, UNSPECIFIED TYPE: Status: ACTIVE | Noted: 2018-07-13

## 2018-07-13 PROBLEM — F41.9 ANXIETY: Status: ACTIVE | Noted: 2018-07-13

## 2018-07-14 ENCOUNTER — NURSE ONLY (OUTPATIENT)
Dept: ALLERGY | Facility: CLINIC | Age: 38
End: 2018-07-14

## 2018-07-14 DIAGNOSIS — J30.89 ENVIRONMENTAL AND SEASONAL ALLERGIES: ICD-10-CM

## 2018-07-14 PROCEDURE — 95117 IMMUNOTHERAPY INJECTIONS: CPT | Performed by: ALLERGY & IMMUNOLOGY

## 2018-07-14 PROCEDURE — 95165 ANTIGEN THERAPY SERVICES: CPT | Performed by: ALLERGY & IMMUNOLOGY

## 2018-08-10 ENCOUNTER — APPOINTMENT (OUTPATIENT)
Dept: LAB | Age: 38
End: 2018-08-10
Attending: INTERNAL MEDICINE
Payer: COMMERCIAL

## 2018-08-10 DIAGNOSIS — F41.9 ANXIETY: ICD-10-CM

## 2018-08-10 DIAGNOSIS — R53.83 FATIGUE, UNSPECIFIED TYPE: ICD-10-CM

## 2018-08-10 PROCEDURE — 82024 ASSAY OF ACTH: CPT

## 2018-08-10 PROCEDURE — 36415 COLL VENOUS BLD VENIPUNCTURE: CPT

## 2018-08-11 LAB — ADRENOCORTICOTROPIC HORMONE: 8 PG/ML

## 2018-08-13 ENCOUNTER — NURSE ONLY (OUTPATIENT)
Dept: ALLERGY | Facility: CLINIC | Age: 38
End: 2018-08-13
Payer: COMMERCIAL

## 2018-08-13 DIAGNOSIS — J30.89 ENVIRONMENTAL AND SEASONAL ALLERGIES: ICD-10-CM

## 2018-08-13 PROCEDURE — 95117 IMMUNOTHERAPY INJECTIONS: CPT | Performed by: ALLERGY & IMMUNOLOGY

## 2018-08-14 ENCOUNTER — OFFICE VISIT (OUTPATIENT)
Dept: FAMILY MEDICINE CLINIC | Facility: CLINIC | Age: 38
End: 2018-08-14
Payer: COMMERCIAL

## 2018-08-14 VITALS
SYSTOLIC BLOOD PRESSURE: 106 MMHG | WEIGHT: 124 LBS | HEART RATE: 67 BPM | RESPIRATION RATE: 20 BRPM | OXYGEN SATURATION: 98 % | TEMPERATURE: 98 F | DIASTOLIC BLOOD PRESSURE: 68 MMHG | HEIGHT: 60 IN | BODY MASS INDEX: 24.35 KG/M2

## 2018-08-14 DIAGNOSIS — Z12.31 ENCOUNTER FOR SCREENING MAMMOGRAM FOR MALIGNANT NEOPLASM OF BREAST: ICD-10-CM

## 2018-08-14 DIAGNOSIS — Z01.419 WELL WOMAN EXAM: Primary | ICD-10-CM

## 2018-08-14 DIAGNOSIS — M12.811 ROTATOR CUFF ARTHROPATHY OF RIGHT SHOULDER: ICD-10-CM

## 2018-08-14 DIAGNOSIS — Z12.4 SCREENING FOR CERVICAL CANCER: ICD-10-CM

## 2018-08-14 DIAGNOSIS — R53.82 CHRONIC FATIGUE: ICD-10-CM

## 2018-08-14 PROCEDURE — 87624 HPV HI-RISK TYP POOLED RSLT: CPT | Performed by: INTERNAL MEDICINE

## 2018-08-14 PROCEDURE — 99396 PREV VISIT EST AGE 40-64: CPT | Performed by: INTERNAL MEDICINE

## 2018-08-14 PROCEDURE — 99213 OFFICE O/P EST LOW 20 MIN: CPT | Performed by: INTERNAL MEDICINE

## 2018-08-14 PROCEDURE — 88175 CYTOPATH C/V AUTO FLUID REDO: CPT | Performed by: INTERNAL MEDICINE

## 2018-08-14 RX ORDER — DEXTROAMPHETAMINE SACCHARATE, AMPHETAMINE ASPARTATE, DEXTROAMPHETAMINE SULFATE AND AMPHETAMINE SULFATE 1.25; 1.25; 1.25; 1.25 MG/1; MG/1; MG/1; MG/1
TABLET ORAL
Qty: 15 TABLET | Refills: 0 | Status: SHIPPED | OUTPATIENT
Start: 2018-08-14 | End: 2018-12-28 | Stop reason: ALTCHOICE

## 2018-08-14 RX ORDER — HYDROCODONE BITARTRATE AND ACETAMINOPHEN 5; 325 MG/1; MG/1
1 TABLET ORAL EVERY 4 HOURS PRN
Qty: 15 TABLET | Refills: 0 | Status: SHIPPED | OUTPATIENT
Start: 2018-08-14 | End: 2018-12-28

## 2018-08-14 RX ORDER — ZOLPIDEM TARTRATE 10 MG/1
10 TABLET ORAL NIGHTLY PRN
Qty: 30 TABLET | Refills: 1 | Status: SHIPPED | OUTPATIENT
Start: 2018-08-14 | End: 2018-12-31

## 2018-08-15 LAB — HPV I/H RISK 1 DNA SPEC QL NAA+PROBE: NEGATIVE

## 2018-08-15 NOTE — PATIENT INSTRUCTIONS
LAB HOURS & LOCATIONS        Alessandra  \[facility]\"")    [address] [address] [address]  [place]     ([address])         [address]    Mon-Fri  5am-8pm     Mon-Fri   7am-4pm  Sat         6am-3pm     Sat          7am-3pm      Itz Perry

## 2018-08-15 NOTE — PROGRESS NOTES
HPI:   Mitchell Jarquin is a 34year old female who presents for a well woman exam. Symptoms: denies discharge, itching, burning or dysuria, periods are regular, flow is 5 days. Patient's last menstrual period was 08/08/2018.   Previous pap: 2016 normal  Pe W/UPPER ADJACENT VAGINA; W/*  02/01/2018: EXCISION TURBINATE,SUBMUCOUS  1/7/16: INJECTION (IA)      Comment: RIGHT SUPRASCAPULAR NERVE BLOCK   No date: KNEE SCOPE,MED+LAT MENIS REPAIR Left  02/01/2018: REPAIR OF NASAL SEPTUM  No date: [address] [place] [address] Drive nourished,in no apparent distress  SKIN: no rashes,no suspicious lesions  HEENT: atraumatic, normocephalic; PERRLA, conjunctiva clear; ears, nose and throat are clear  NECK: supple,no adenopathy,no thyromegaly  BREASTS: no retractions, no suspicious mass,

## 2018-08-18 ENCOUNTER — LAB ENCOUNTER (OUTPATIENT)
Dept: LAB | Facility: HOSPITAL | Age: 38
End: 2018-08-18
Attending: INTERNAL MEDICINE
Payer: COMMERCIAL

## 2018-08-18 DIAGNOSIS — R53.83 FATIGUE: ICD-10-CM

## 2018-08-18 DIAGNOSIS — R53.83 FATIGUE, UNSPECIFIED TYPE: ICD-10-CM

## 2018-08-18 DIAGNOSIS — Z01.419 WELL WOMAN EXAM: ICD-10-CM

## 2018-08-18 LAB
ALBUMIN SERPL-MCNC: 3.4 G/DL (ref 3.5–4.8)
ALBUMIN/GLOB SERPL: 1 {RATIO} (ref 1–2)
ALP LIVER SERPL-CCNC: 52 U/L (ref 37–98)
ALT SERPL-CCNC: 19 U/L (ref 14–54)
ANION GAP SERPL CALC-SCNC: 6 MMOL/L (ref 0–18)
AST SERPL-CCNC: 16 U/L (ref 15–41)
BASOPHILS # BLD AUTO: 0.05 X10(3) UL (ref 0–0.1)
BASOPHILS NFR BLD AUTO: 1 %
BILIRUB SERPL-MCNC: 0.3 MG/DL (ref 0.1–2)
BUN BLD-MCNC: 16 MG/DL (ref 8–20)
BUN/CREAT SERPL: 23.2 (ref 10–20)
CALCIUM BLD-MCNC: 8.6 MG/DL (ref 8.3–10.3)
CHLORIDE SERPL-SCNC: 108 MMOL/L (ref 101–111)
CHOLEST SMN-MCNC: 121 MG/DL (ref ?–200)
CO2 SERPL-SCNC: 27 MMOL/L (ref 22–32)
CREAT BLD-MCNC: 0.69 MG/DL (ref 0.55–1.02)
EOSINOPHIL # BLD AUTO: 0.24 X10(3) UL (ref 0–0.3)
EOSINOPHIL NFR BLD AUTO: 4.6 %
ERYTHROCYTE [DISTWIDTH] IN BLOOD BY AUTOMATED COUNT: 12.4 % (ref 11.5–16)
GLOBULIN PLAS-MCNC: 3.4 G/DL (ref 2.5–4)
GLUCOSE BLD-MCNC: 90 MG/DL (ref 70–99)
HAV AB SERPL IA-ACNC: 543 PG/ML (ref 193–986)
HCT VFR BLD AUTO: 42.3 % (ref 34–50)
HDLC SERPL-MCNC: 80 MG/DL (ref 40–59)
HGB BLD-MCNC: 14.1 G/DL (ref 12–16)
IMMATURE GRANULOCYTE COUNT: 0.01 X10(3) UL (ref 0–1)
IMMATURE GRANULOCYTE RATIO %: 0.2 %
LDLC SERPL CALC-MCNC: 35 MG/DL (ref ?–100)
LYMPHOCYTES # BLD AUTO: 1.79 X10(3) UL (ref 0.9–4)
LYMPHOCYTES NFR BLD AUTO: 34.6 %
M PROTEIN MFR SERPL ELPH: 6.8 G/DL (ref 6.1–8.3)
MCH RBC QN AUTO: 29.7 PG (ref 27–33.2)
MCHC RBC AUTO-ENTMCNC: 33.3 G/DL (ref 31–37)
MCV RBC AUTO: 89.2 FL (ref 81–100)
MONOCYTES # BLD AUTO: 0.44 X10(3) UL (ref 0.1–1)
MONOCYTES NFR BLD AUTO: 8.5 %
NEUTROPHIL ABS PRELIM: 2.65 X10 (3) UL (ref 1.3–6.7)
NEUTROPHILS # BLD AUTO: 2.65 X10(3) UL (ref 1.3–6.7)
NEUTROPHILS NFR BLD AUTO: 51.1 %
NONHDLC SERPL-MCNC: 41 MG/DL (ref ?–130)
OSMOLALITY SERPL CALC.SUM OF ELEC: 293 MOSM/KG (ref 275–295)
PLATELET # BLD AUTO: 273 10(3)UL (ref 150–450)
POTASSIUM SERPL-SCNC: 4.7 MMOL/L (ref 3.6–5.1)
RBC # BLD AUTO: 4.74 X10(6)UL (ref 3.8–5.1)
RED CELL DISTRIBUTION WIDTH-SD: 40.9 FL (ref 35.1–46.3)
SODIUM SERPL-SCNC: 141 MMOL/L (ref 136–144)
T4 FREE SERPL-MCNC: 0.8 NG/DL (ref 0.9–1.8)
T4 SERPL-MCNC: 6.1 UG/DL (ref 4.5–10.9)
TRIGL SERPL-MCNC: 31 MG/DL (ref 30–149)
TSI SER-ACNC: 1.48 MIU/ML (ref 0.35–5.5)
VIT D+METAB SERPL-MCNC: 57.5 NG/ML (ref 30–100)
VLDLC SERPL CALC-MCNC: 6 MG/DL (ref 0–30)
WBC # BLD AUTO: 5.2 X10(3) UL (ref 4–13)

## 2018-08-18 PROCEDURE — 84443 ASSAY THYROID STIM HORMONE: CPT

## 2018-08-18 PROCEDURE — 36415 COLL VENOUS BLD VENIPUNCTURE: CPT

## 2018-08-18 PROCEDURE — 84436 ASSAY OF TOTAL THYROXINE: CPT

## 2018-08-18 PROCEDURE — 80061 LIPID PANEL: CPT

## 2018-08-18 PROCEDURE — 82607 VITAMIN B-12: CPT

## 2018-08-18 PROCEDURE — 84439 ASSAY OF FREE THYROXINE: CPT

## 2018-08-18 PROCEDURE — 82306 VITAMIN D 25 HYDROXY: CPT

## 2018-08-18 PROCEDURE — 80053 COMPREHEN METABOLIC PANEL: CPT

## 2018-08-18 PROCEDURE — 85025 COMPLETE CBC W/AUTO DIFF WBC: CPT

## 2018-08-20 ENCOUNTER — PATIENT MESSAGE (OUTPATIENT)
Dept: FAMILY MEDICINE CLINIC | Facility: CLINIC | Age: 38
End: 2018-08-20

## 2018-08-21 RX ORDER — DEXTROAMPHETAMINE SACCHARATE, AMPHETAMINE ASPARTATE, DEXTROAMPHETAMINE SULFATE AND AMPHETAMINE SULFATE 3.75; 3.75; 3.75; 3.75 MG/1; MG/1; MG/1; MG/1
15 TABLET ORAL DAILY
Qty: 15 TABLET | Refills: 0 | Status: SHIPPED | OUTPATIENT
Start: 2018-08-21 | End: 2018-09-05

## 2018-08-21 NOTE — TELEPHONE ENCOUNTER
Script signed for #15 tabs, given to [facility]. Pt will be called to  at the .

## 2018-08-21 NOTE — TELEPHONE ENCOUNTER
From: Darnell Gamino  To: Lulu Long NP  Sent: 8/20/2018 8:36 PM CDT  Subject: Prescription Question    Karri Peña, here is my Adderall update regarding use for increasing fatigue.  I started the 5mg and did notice some awareness of the medic

## 2018-08-31 ENCOUNTER — HOSPITAL ENCOUNTER (OUTPATIENT)
Dept: GENERAL RADIOLOGY | Facility: HOSPITAL | Age: 38
Discharge: HOME | End: 2018-08-31
Attending: PREVENTIVE MEDICINE
Payer: OTHER MISCELLANEOUS

## 2018-08-31 ENCOUNTER — OFFICE VISIT (OUTPATIENT)
Dept: OTHER | Facility: HOSPITAL | Age: 38
End: 2018-08-31
Attending: PREVENTIVE MEDICINE
Payer: OTHER MISCELLANEOUS

## 2018-08-31 DIAGNOSIS — S86.911A KNEE STRAIN, RIGHT, INITIAL ENCOUNTER: Primary | ICD-10-CM

## 2018-08-31 PROCEDURE — 73562 X-RAY EXAM OF KNEE 3: CPT | Performed by: PREVENTIVE MEDICINE

## 2018-09-05 ENCOUNTER — APPOINTMENT (OUTPATIENT)
Dept: OTHER | Facility: HOSPITAL | Age: 38
End: 2018-09-05
Attending: PREVENTIVE MEDICINE
Payer: OTHER MISCELLANEOUS

## 2018-09-13 ENCOUNTER — HOSPITAL ENCOUNTER (OUTPATIENT)
Dept: MRI IMAGING | Age: 38
Discharge: HOME | End: 2018-09-13
Attending: PREVENTIVE MEDICINE
Payer: OTHER MISCELLANEOUS

## 2018-09-13 DIAGNOSIS — M25.561 MEDIAL KNEE PAIN, RIGHT: ICD-10-CM

## 2018-09-13 DIAGNOSIS — M25.561 RIGHT KNEE PAIN: ICD-10-CM

## 2018-09-13 DIAGNOSIS — M25.461 EFFUSION OF RIGHT KNEE: ICD-10-CM

## 2018-09-13 PROCEDURE — 73721 MRI JNT OF LWR EXTRE W/O DYE: CPT | Performed by: PREVENTIVE MEDICINE

## 2018-09-14 ENCOUNTER — OFFICE VISIT (OUTPATIENT)
Dept: OTHER | Facility: HOSPITAL | Age: 38
End: 2018-09-14
Attending: FAMILY MEDICINE
Payer: OTHER MISCELLANEOUS

## 2018-09-14 DIAGNOSIS — M25.569 KNEE PAIN, UNSPECIFIED CHRONICITY, UNSPECIFIED LATERALITY: Primary | ICD-10-CM

## 2018-09-14 RX ORDER — CYCLOBENZAPRINE HCL 10 MG
TABLET ORAL
Qty: 10 TABLET | Refills: 0 | Status: SHIPPED | OUTPATIENT
Start: 2018-09-14 | End: 2018-09-16

## 2018-09-20 ENCOUNTER — OFFICE VISIT (OUTPATIENT)
Dept: OTHER | Facility: HOSPITAL | Age: 38
End: 2018-09-20
Attending: PREVENTIVE MEDICINE
Payer: OTHER MISCELLANEOUS

## 2018-09-20 RX ORDER — ACETAMINOPHEN AND CODEINE PHOSPHATE 300; 30 MG/1; MG/1
TABLET ORAL
Qty: 18 TABLET | Refills: 0 | Status: SHIPPED | OUTPATIENT
Start: 2018-09-20 | End: 2018-12-28 | Stop reason: ALTCHOICE

## 2018-09-20 NOTE — PATIENT INSTRUCTIONS
Ace wrap    Tyl # 3  1-2 every bedtime as needed. Acetaminophen; Codeine tablets   Brand Names: Tylenol with Codeine No.3, Tylenol with Codeine No.4  What is this medicine? ACETAMINOPHEN; CODEINE (a set a MOON lang fen; BARBARA moss) is a pain reliever.  It · antiviral medicines used for HIV or AIDS  · atropine  · certain antibiotics like erythromycin and clarithromycin  · certain medicines for anxiety or sleep  · certain medicines for bladder problems like oxybutynin, tolterodine  · certain medicines for dep This medicine may cause accidental overdose and death if it taken by other adults, children, or pets. Mix any unused medicine with a substance like cat litter or coffee grounds.  Then throw the medicine away in a sealed container like a sealed bag or a coff There are different types of narcotic medicines (opiates). If you take more than one type at the same time or if you are taking another medicine that also causes drowsiness, you may have more side effects.  Give your health care provider a list of all medic NOTE:This sheet is a summary. It may not cover all possible information. If you have questions about this medicine, talk to your doctor, pharmacist, or health care provider.  Copyright© 2018 Elsevier

## 2018-09-24 ENCOUNTER — NURSE ONLY (OUTPATIENT)
Dept: ALLERGY | Facility: CLINIC | Age: 38
End: 2018-09-24
Payer: COMMERCIAL

## 2018-09-24 DIAGNOSIS — J30.89 ENVIRONMENTAL AND SEASONAL ALLERGIES: ICD-10-CM

## 2018-09-24 PROCEDURE — 95117 IMMUNOTHERAPY INJECTIONS: CPT | Performed by: ALLERGY & IMMUNOLOGY

## 2018-10-06 ENCOUNTER — NURSE ONLY (OUTPATIENT)
Dept: ALLERGY | Facility: CLINIC | Age: 38
End: 2018-10-06
Payer: COMMERCIAL

## 2018-10-06 DIAGNOSIS — J30.89 ENVIRONMENTAL AND SEASONAL ALLERGIES: ICD-10-CM

## 2018-10-06 PROCEDURE — 95117 IMMUNOTHERAPY INJECTIONS: CPT | Performed by: ALLERGY & IMMUNOLOGY

## 2018-10-06 PROCEDURE — 95165 ANTIGEN THERAPY SERVICES: CPT | Performed by: ALLERGY & IMMUNOLOGY

## 2018-10-20 ENCOUNTER — NURSE ONLY (OUTPATIENT)
Dept: ALLERGY | Facility: CLINIC | Age: 38
End: 2018-10-20
Payer: COMMERCIAL

## 2018-10-20 DIAGNOSIS — J30.89 ENVIRONMENTAL AND SEASONAL ALLERGIES: ICD-10-CM

## 2018-10-20 PROCEDURE — 95117 IMMUNOTHERAPY INJECTIONS: CPT | Performed by: ALLERGY & IMMUNOLOGY

## 2018-10-20 PROCEDURE — 95165 ANTIGEN THERAPY SERVICES: CPT | Performed by: ALLERGY & IMMUNOLOGY

## 2018-11-01 ENCOUNTER — PATIENT MESSAGE (OUTPATIENT)
Dept: FAMILY MEDICINE CLINIC | Facility: CLINIC | Age: 38
End: 2018-11-01

## 2018-11-01 NOTE — TELEPHONE ENCOUNTER
From: Renate Clancy  To: Edward Wood NP  Sent: 11/1/2018 2:11 PM CDT  Subject: Prescription Question    Hello Viola Cordial,    I wanted to follow-up and see if you may be able to refill the Adderall 15mg at this point in time.  I know your re-licensi

## 2018-11-12 ENCOUNTER — NURSE ONLY (OUTPATIENT)
Dept: ALLERGY | Facility: CLINIC | Age: 38
End: 2018-11-12
Payer: COMMERCIAL

## 2018-11-12 DIAGNOSIS — J30.89 ENVIRONMENTAL AND SEASONAL ALLERGIES: ICD-10-CM

## 2018-11-12 PROCEDURE — 95117 IMMUNOTHERAPY INJECTIONS: CPT | Performed by: ALLERGY & IMMUNOLOGY

## 2018-11-14 ENCOUNTER — APPOINTMENT (OUTPATIENT)
Dept: PHYSICAL THERAPY | Facility: HOSPITAL | Age: 38
End: 2018-11-14
Attending: FAMILY MEDICINE
Payer: OTHER MISCELLANEOUS

## 2018-11-19 ENCOUNTER — HOSPITAL ENCOUNTER (OUTPATIENT)
Dept: PHYSICAL THERAPY | Facility: HOSPITAL | Age: 38
Setting detail: THERAPIES SERIES
Discharge: HOME | End: 2018-11-19
Attending: FAMILY MEDICINE
Payer: OTHER MISCELLANEOUS

## 2018-11-19 DIAGNOSIS — S83.241D TEAR OF MEDIAL MENISCUS OF RIGHT KNEE, CURRENT, UNSPECIFIED TEAR TYPE, SUBSEQUENT ENCOUNTER: ICD-10-CM

## 2018-11-19 PROCEDURE — 97162 PT EVAL MOD COMPLEX 30 MIN: CPT

## 2018-11-19 PROCEDURE — 97110 THERAPEUTIC EXERCISES: CPT

## 2018-11-19 NOTE — PROGRESS NOTES
LOWER EXTREMITY EVALUATION:   Referring Physician: Dr. Nkechi Woodall  Diagnosis: Tear of medial meniscus of right knee, current, unspecified tear type, subsequent encounter  Date of Service: 11/19/2018     PATIENT SUMMARY   Terrell Armstrong is a 34year old female feeling of numbness.    Current functional limitations include quick movements, negotiating stairs (especially down), running, squatting, pushing pedals on cart at work, balance feels off, RLE feels weak, walking getting more normal with no obvious limp, mo above impairments to improve mobility, decrease pain, and return to PLOF.      Precautions:  None  OBJECTIVE:   Observation: Skin intact, appropriate coloring, very minimal edema quad, diminished quad mm tone R as compared to L; in standing R knee appears t ankle, SLR, bridge, SL hip abd, heel slides    Charges: PT Eval Moderate Complexity, Therex x 1      Total Timed Treatment: 15 min     Total Treatment Time: 45 min     PLAN OF CARE:    Goals: (To be completed in 10 visits)  1.  Patient will improve R quad s Date____________________    Certification From: 77/06/5450  To:2/17/2019

## 2018-11-21 ENCOUNTER — HOSPITAL ENCOUNTER (OUTPATIENT)
Dept: PHYSICAL THERAPY | Facility: HOSPITAL | Age: 38
Setting detail: THERAPIES SERIES
Discharge: HOME | End: 2018-11-21
Attending: FAMILY MEDICINE
Payer: OTHER MISCELLANEOUS

## 2018-11-21 PROCEDURE — 97140 MANUAL THERAPY 1/> REGIONS: CPT

## 2018-11-21 PROCEDURE — 97110 THERAPEUTIC EXERCISES: CPT

## 2018-11-21 NOTE — PROGRESS NOTES
Dx: Tear of medial meniscus of right knee, current, unspecified tear type, subsequent encounter          Authorized # of Visits:  10 requested         Next MD visit: none scheduled  Fall Risk: standard         Precautions: n/a             Subjective: Patie Bike L3 x 5 min         QS with towel roll, 2 x 10         SB ROM curls x 10         - SLR 2 x 10  - SL abd 2 x 10         Bridges 2 x 10         - 6\" fwd step up x 20  - 6\" lateral step up x 20  - 4\" fwd tap down, cueing eccentric control 2 x 10

## 2018-11-26 ENCOUNTER — APPOINTMENT (OUTPATIENT)
Dept: PHYSICAL THERAPY | Facility: HOSPITAL | Age: 38
End: 2018-11-26
Payer: OTHER MISCELLANEOUS

## 2018-11-26 ENCOUNTER — HOSPITAL ENCOUNTER (OUTPATIENT)
Dept: PHYSICAL THERAPY | Facility: HOSPITAL | Age: 38
Setting detail: THERAPIES SERIES
Discharge: HOME | End: 2018-11-26
Attending: ORTHOPAEDIC SURGERY
Payer: OTHER MISCELLANEOUS

## 2018-11-26 PROCEDURE — 97140 MANUAL THERAPY 1/> REGIONS: CPT

## 2018-11-26 PROCEDURE — 97110 THERAPEUTIC EXERCISES: CPT

## 2018-11-26 NOTE — PROGRESS NOTES
Dx: Tear of medial meniscus of right knee, current, unspecified tear type, subsequent encounter          Authorized # of Visits:  10 requested         Next MD visit: none scheduled  Fall Risk: standard         Precautions: n/a             Subjective: Patie reviewed    Plan: Continue PT with progression as indicated.    Date: 11/21/2018 TX#: 2/10 Date: 11/26/18 TX#: 3/10   Date:               TX#: 4/ Date:               TX#: 5/ Date:               TX#: 6/ Date:               TX#: 7/ Date:               TX#: 8/

## 2018-11-28 ENCOUNTER — HOSPITAL ENCOUNTER (OUTPATIENT)
Dept: PHYSICAL THERAPY | Facility: HOSPITAL | Age: 38
Setting detail: THERAPIES SERIES
Discharge: HOME | End: 2018-11-28
Attending: ORTHOPAEDIC SURGERY
Payer: OTHER MISCELLANEOUS

## 2018-11-28 PROCEDURE — 97110 THERAPEUTIC EXERCISES: CPT

## 2018-11-28 NOTE — PROGRESS NOTES
Dx: Tear of medial meniscus of right knee, current, unspecified tear type, subsequent encounter          Authorized # of Visits:  10 requested         Next MD visit: none scheduled  Fall Risk: standard         Precautions: n/a             Subjective: Patie min Elliptical L3 x6'       QS with towel roll, 2 x 10 Performed on shuttle  Manual ext mobs grade 2-3       SB ROM curls x 10 Shuttle  - DLS, 50# x 25  - SLS, 37# 2 x 10 Kinesiology tape to R knee for medial knee pain       - SLR 2 x 10  - SL abd 2 x 10 S

## 2018-12-03 ENCOUNTER — HOSPITAL ENCOUNTER (OUTPATIENT)
Dept: PHYSICAL THERAPY | Facility: HOSPITAL | Age: 38
Setting detail: THERAPIES SERIES
Discharge: HOME | End: 2018-12-03
Attending: FAMILY MEDICINE
Payer: OTHER MISCELLANEOUS

## 2018-12-03 PROCEDURE — 97110 THERAPEUTIC EXERCISES: CPT

## 2018-12-03 NOTE — PROGRESS NOTES
Dx: Tear of medial meniscus of right knee, current, unspecified tear type, subsequent encounter          Authorized # of Visits:  10 requested         Next MD visit: none scheduled  Fall Risk: standard         Precautions: n/a             Subjective: Patie 4. Patient will report ability to perform full work day without pain. 5. Patient will demonstrate ability to perform R SLS x 15 sec on airex without pain for gradual return to hiking. - progress  6.  Patient will be independent and compliant in HEP to m without superior glides Single leg fwd lunge and UE reach to cone x15 TRX single leg step back lunge 2 x 10 (fatigued glute)      Skilled Services: Manual therapy to improve mobility, dec pain.  Therex progression within tolerance, inc time spent in WB and

## 2018-12-05 ENCOUNTER — HOSPITAL ENCOUNTER (OUTPATIENT)
Dept: PHYSICAL THERAPY | Facility: HOSPITAL | Age: 38
Setting detail: THERAPIES SERIES
Discharge: HOME | End: 2018-12-05
Attending: FAMILY MEDICINE
Payer: OTHER MISCELLANEOUS

## 2018-12-05 PROCEDURE — 97110 THERAPEUTIC EXERCISES: CPT

## 2018-12-05 PROCEDURE — 97140 MANUAL THERAPY 1/> REGIONS: CPT

## 2018-12-10 ENCOUNTER — NURSE ONLY (OUTPATIENT)
Dept: ALLERGY | Facility: CLINIC | Age: 38
End: 2018-12-10
Payer: COMMERCIAL

## 2018-12-10 DIAGNOSIS — J30.89 ENVIRONMENTAL AND SEASONAL ALLERGIES: ICD-10-CM

## 2018-12-10 PROCEDURE — 95165 ANTIGEN THERAPY SERVICES: CPT | Performed by: ALLERGY & IMMUNOLOGY

## 2018-12-10 PROCEDURE — 95117 IMMUNOTHERAPY INJECTIONS: CPT | Performed by: ALLERGY & IMMUNOLOGY

## 2018-12-28 ENCOUNTER — LAB ENCOUNTER (OUTPATIENT)
Dept: LAB | Age: 38
End: 2018-12-28
Attending: FAMILY MEDICINE
Payer: COMMERCIAL

## 2018-12-28 ENCOUNTER — OFFICE VISIT (OUTPATIENT)
Dept: FAMILY MEDICINE CLINIC | Facility: CLINIC | Age: 38
End: 2018-12-28
Payer: COMMERCIAL

## 2018-12-28 VITALS
HEART RATE: 96 BPM | RESPIRATION RATE: 18 BRPM | OXYGEN SATURATION: 99 % | TEMPERATURE: 98 F | SYSTOLIC BLOOD PRESSURE: 104 MMHG | BODY MASS INDEX: 24.54 KG/M2 | DIASTOLIC BLOOD PRESSURE: 70 MMHG | WEIGHT: 125 LBS | HEIGHT: 60 IN

## 2018-12-28 DIAGNOSIS — Z01.818 PREOP EXAMINATION: ICD-10-CM

## 2018-12-28 DIAGNOSIS — S83.231A COMPLEX TEAR OF MEDIAL MENISCUS OF RIGHT KNEE AS CURRENT INJURY, INITIAL ENCOUNTER: ICD-10-CM

## 2018-12-28 DIAGNOSIS — Z01.818 PREOP EXAMINATION: Primary | ICD-10-CM

## 2018-12-28 PROCEDURE — 80053 COMPREHEN METABOLIC PANEL: CPT

## 2018-12-28 PROCEDURE — 99243 OFF/OP CNSLTJ NEW/EST LOW 30: CPT | Performed by: FAMILY MEDICINE

## 2018-12-28 PROCEDURE — 36415 COLL VENOUS BLD VENIPUNCTURE: CPT

## 2018-12-28 PROCEDURE — 85025 COMPLETE CBC W/AUTO DIFF WBC: CPT

## 2018-12-28 RX ORDER — HYDROCODONE BITARTRATE AND ACETAMINOPHEN 5; 325 MG/1; MG/1
1 TABLET ORAL EVERY 6 HOURS PRN
Qty: 20 TABLET | Refills: 0 | Status: SHIPPED | OUTPATIENT
Start: 2018-12-28 | End: 2019-01-14

## 2018-12-28 NOTE — PROGRESS NOTES
HPI:    Patient ID: Melony Lanza is a 34year old female. Pt came in for a Pre-Op exam for knee arthroscopy of her right knee. This is because she tore her medial meniscus in her right knee.  Pre-Op was requested by Dr. Mary Kay Aguilar (who will also be performin Exam   Constitutional: She appears well-developed and well-nourished. HENT:   Head: Normocephalic. Right Ear: External ear normal.   Left Ear: External ear normal.   Mouth/Throat: Oropharynx is clear and moist. No oropharyngeal exudate.    Eyes: Conjunc

## 2018-12-31 ENCOUNTER — OFFICE VISIT (OUTPATIENT)
Dept: PAIN CLINIC | Facility: CLINIC | Age: 38
End: 2018-12-31
Payer: COMMERCIAL

## 2018-12-31 ENCOUNTER — TELEPHONE (OUTPATIENT)
Dept: PAIN CLINIC | Facility: CLINIC | Age: 38
End: 2018-12-31

## 2018-12-31 VITALS
SYSTOLIC BLOOD PRESSURE: 110 MMHG | BODY MASS INDEX: 24 KG/M2 | WEIGHT: 125 LBS | HEART RATE: 72 BPM | DIASTOLIC BLOOD PRESSURE: 80 MMHG

## 2018-12-31 DIAGNOSIS — M79.18 MYOFASCIAL PAIN: Primary | ICD-10-CM

## 2018-12-31 PROCEDURE — 20552 NJX 1/MLT TRIGGER POINT 1/2: CPT | Performed by: ANESTHESIOLOGY

## 2018-12-31 RX ORDER — LIDOCAINE HYDROCHLORIDE 10 MG/ML
11 INJECTION, SOLUTION INFILTRATION; PERINEURAL ONCE
Status: COMPLETED | OUTPATIENT
Start: 2018-12-31 | End: 2018-12-31

## 2018-12-31 RX ORDER — METHYLPREDNISOLONE ACETATE 40 MG/ML
80 INJECTION, SUSPENSION INTRA-ARTICULAR; INTRALESIONAL; INTRAMUSCULAR; SOFT TISSUE ONCE
Status: COMPLETED | OUTPATIENT
Start: 2018-12-31 | End: 2018-12-31

## 2018-12-31 RX ORDER — ZOLPIDEM TARTRATE 10 MG/1
10 TABLET ORAL NIGHTLY PRN
Qty: 30 TABLET | Refills: 1 | Status: SHIPPED | OUTPATIENT
Start: 2018-12-31 | End: 2019-04-12 | Stop reason: ALTCHOICE

## 2018-12-31 NOTE — PROGRESS NOTES
[facility]  Operative Report  2018     Romina Janell Patient Status:  No patient class for patient encounter    1980 MRN [id number]   Location ED [facility], [address], [address] Attending No att. providers found   [address] Day Kiara Salas MD

## 2018-12-31 NOTE — TELEPHONE ENCOUNTER
Spoke to Rose Mary at [address], located within [facility], [employer] ([phone]) is valid and billable, no prior authorization or predetermination required. Call reference #[id number], time on call: 9:30.

## 2019-01-03 ENCOUNTER — TELEPHONE (OUTPATIENT)
Dept: FAMILY MEDICINE CLINIC | Facility: CLINIC | Age: 39
End: 2019-01-03

## 2019-01-03 NOTE — TELEPHONE ENCOUNTER
Pt says Pharmacy says they did not get Zolpidem Tartrate 10mg, please call it in again. Pt is waiting,has already been to pharmacy and had to leave.  Edgard santos and book

## 2019-01-03 NOTE — TELEPHONE ENCOUNTER
rx was approved on 12/31, was phoned into pharmacy but lost in the shuffle per pharmacy. rx given ok .

## 2019-01-07 ENCOUNTER — NURSE ONLY (OUTPATIENT)
Dept: ALLERGY | Facility: CLINIC | Age: 39
End: 2019-01-07
Payer: COMMERCIAL

## 2019-01-07 DIAGNOSIS — J30.89 ENVIRONMENTAL AND SEASONAL ALLERGIES: ICD-10-CM

## 2019-01-07 PROCEDURE — 95165 ANTIGEN THERAPY SERVICES: CPT | Performed by: ALLERGY & IMMUNOLOGY

## 2019-01-07 PROCEDURE — 95117 IMMUNOTHERAPY INJECTIONS: CPT | Performed by: ALLERGY & IMMUNOLOGY

## 2019-01-10 ENCOUNTER — TELEPHONE (OUTPATIENT)
Dept: PAIN CLINIC | Facility: CLINIC | Age: 39
End: 2019-01-10

## 2019-01-10 DIAGNOSIS — M54.14 THORACIC RADICULITIS: ICD-10-CM

## 2019-01-10 DIAGNOSIS — M54.12 CERVICAL RADICULOPATHY: Primary | ICD-10-CM

## 2019-01-29 ENCOUNTER — OFFICE VISIT (OUTPATIENT)
Dept: FAMILY MEDICINE CLINIC | Facility: CLINIC | Age: 39
End: 2019-01-29
Payer: COMMERCIAL

## 2019-01-29 VITALS
OXYGEN SATURATION: 99 % | TEMPERATURE: 98 F | HEART RATE: 88 BPM | SYSTOLIC BLOOD PRESSURE: 110 MMHG | RESPIRATION RATE: 18 BRPM | DIASTOLIC BLOOD PRESSURE: 68 MMHG | BODY MASS INDEX: 24.54 KG/M2 | HEIGHT: 60 IN | WEIGHT: 125 LBS

## 2019-01-29 DIAGNOSIS — M54.2 CERVICALGIA: Primary | ICD-10-CM

## 2019-01-29 DIAGNOSIS — G89.4 CHRONIC PAIN SYNDROME: ICD-10-CM

## 2019-01-29 PROCEDURE — 99214 OFFICE O/P EST MOD 30 MIN: CPT | Performed by: INTERNAL MEDICINE

## 2019-01-29 RX ORDER — HYDROCODONE BITARTRATE AND ACETAMINOPHEN 5; 325 MG/1; MG/1
1 TABLET ORAL 2 TIMES DAILY PRN
Qty: 30 TABLET | Refills: 0 | Status: SHIPPED | OUTPATIENT
Start: 2019-01-29 | End: 2019-02-15 | Stop reason: ALTCHOICE

## 2019-01-29 RX ORDER — PREDNISONE 50 MG/1
TABLET ORAL
Qty: 5 TABLET | Refills: 0 | Status: SHIPPED | OUTPATIENT
Start: 2019-01-29 | End: 2019-02-15 | Stop reason: ALTCHOICE

## 2019-02-02 NOTE — PROGRESS NOTES
Cristiano Keenan is a 34year old female. HPI:   Pt here to discuss pain mgmt for her neck. Neck pain started 1 week ago  Seen by Talia Cooper, pt declined imaging and its been inferred that she has bulging discs.   Used to see Dr. Daron Garcia for other pain lesions or rashes  RESPIRATORY: denies shortness of breath or cough  CARDIOVASCULAR: denies chest pain or pressure  GI: denies N/V; no constipation  MS: neck pain that does not radiate to the extremities; no headaches; no spasms; worse with movement  NEURO

## 2019-02-04 ENCOUNTER — HOSPITAL ENCOUNTER (OUTPATIENT)
Dept: PHYSICAL THERAPY | Facility: HOSPITAL | Age: 39
Setting detail: THERAPIES SERIES
Discharge: HOME | End: 2019-02-04
Attending: FAMILY MEDICINE
Payer: OTHER MISCELLANEOUS

## 2019-02-04 PROCEDURE — 97110 THERAPEUTIC EXERCISES: CPT

## 2019-02-04 PROCEDURE — 97162 PT EVAL MOD COMPLEX 30 MIN: CPT

## 2019-02-04 NOTE — PROGRESS NOTES
POST-OP KNEE EVALUATION:   Referring Physician: Dr. Jenni Reyes  Diagnosis: S/P right knee arthroscopy     Date of Service: 2/4/2019     PATIENT SUMMARY   Gary Neri is a 34year old female who presents to therapy today s/p R knee arthroscopy on 1/16/19. medical history was reviewed with Itzel Tamayo. Significant findings include:  Past Medical History:   Diagnosis Date   • Allergic rhinitis    • Mental disorder     anxiety in the past, was on meds.    • PCOS (polycystic ovarian syndrome)    • Thoracic nerve root i 5/5  Abduction: R 4/5; L 5/5  ER: R 4+/5; L 5/5 Flexion: R 4/5; L 5/5  Extension: R 4+/5; L 5/5        Balance: SLS: R 10 sec with sway, L >30 sec with sway    Functional Mobility:  Lunging: MIN medial knee collapse with RLE fwd, able to control with VC  8 day period. Treatment will include: Manual Therapy; Therapeutic Exercises; Neuromuscular Re-education; Therapeutic Activity; Gait Training; Pt education; Home exercise program instructions with progression as indicated.      Education or treatment limitati

## 2019-02-06 ENCOUNTER — HOSPITAL ENCOUNTER (OUTPATIENT)
Dept: PHYSICAL THERAPY | Facility: HOSPITAL | Age: 39
Setting detail: THERAPIES SERIES
Discharge: HOME | End: 2019-02-06
Attending: FAMILY MEDICINE
Payer: OTHER MISCELLANEOUS

## 2019-02-06 PROCEDURE — 97140 MANUAL THERAPY 1/> REGIONS: CPT

## 2019-02-06 PROCEDURE — 97110 THERAPEUTIC EXERCISES: CPT

## 2019-02-06 NOTE — PROGRESS NOTES
Dx: S/P right knee arthroscopy         Authorized # of Visits:  10 requested (8 approved)         Next MD visit: none scheduled  Fall Risk: standard         Precautions: n/a             Subjective: Patient states that knee is feeling really good, she has n Date: 2/6/2019 TX#: 2/8 Date:               TX#: 3/   Date:               TX#: 4/ Date:               TX#: 5/ Date:               TX#: 6/ Date:               TX#: 7/ Date:               TX#: 8/   Stationary bike L3 x 5 min         QS with towel roll unde

## 2019-02-07 ENCOUNTER — NURSE ONLY (OUTPATIENT)
Dept: ALLERGY | Facility: CLINIC | Age: 39
End: 2019-02-07
Payer: COMMERCIAL

## 2019-02-07 DIAGNOSIS — J30.89 ENVIRONMENTAL AND SEASONAL ALLERGIES: ICD-10-CM

## 2019-02-07 PROCEDURE — 95117 IMMUNOTHERAPY INJECTIONS: CPT | Performed by: ALLERGY & IMMUNOLOGY

## 2019-02-11 ENCOUNTER — HOSPITAL ENCOUNTER (OUTPATIENT)
Dept: PHYSICAL THERAPY | Facility: HOSPITAL | Age: 39
Setting detail: THERAPIES SERIES
Discharge: HOME | End: 2019-02-11
Attending: FAMILY MEDICINE
Payer: OTHER MISCELLANEOUS

## 2019-02-11 PROCEDURE — 97140 MANUAL THERAPY 1/> REGIONS: CPT

## 2019-02-11 PROCEDURE — 97110 THERAPEUTIC EXERCISES: CPT

## 2019-02-11 NOTE — PROGRESS NOTES
Dx: S/P right knee arthroscopy         Authorized # of Visits:  10 requested (8 approved)         Next MD visit: 2/25/19  Fall Risk: standard         Precautions: n/a             Subjective: Patient reports that she has been doing well.  She is reminded fro TX#: 4/ Date:               TX#: 5/ Date:               TX#: 6/ Date:               TX#: 7/ Date:               TX#: 8/   Stationary bike L3 x 5 min Elliptical L5 x 5 min        QS with towel roll under heel 2 x 10 Shuttle  - DLS, 24# 3 x 10  - SL

## 2019-02-13 ENCOUNTER — HOSPITAL ENCOUNTER (OUTPATIENT)
Dept: PHYSICAL THERAPY | Facility: HOSPITAL | Age: 39
Setting detail: THERAPIES SERIES
Discharge: HOME | End: 2019-02-13
Attending: FAMILY MEDICINE
Payer: OTHER MISCELLANEOUS

## 2019-02-13 PROCEDURE — 97112 NEUROMUSCULAR REEDUCATION: CPT

## 2019-02-13 PROCEDURE — 97110 THERAPEUTIC EXERCISES: CPT

## 2019-02-13 NOTE — PROGRESS NOTES
Dx: S/P right knee arthroscopy         Authorized # of Visits:  10 requested (8 approved)         Next MD visit: 2/25/19  Fall Risk: standard         Precautions: n/a             Subjective: Patient reports that she has continued to feel really well in reg reviewed     Plan: Continue PT with progression as indicated.  Attempt alt hops on Shuttle (did not perform on 2/13 due to improper shoes),    Date: 2/6/2019 TX#: 2/8 Date: 2/11/19   TX#: 3/8   Date: 2/13/19    TX#: 4/8 Date:               TX#: 5/ Date: complaint surface training, and return to higher level activity. HEP: QS with towel roll under heel, LAQ with slow control, prone quad stretch with strap, SLS to raised seat height    Charges:  Therex x 2, NR x 1       Total Timed Treatment: 42 min  Total

## 2019-02-15 ENCOUNTER — OFFICE VISIT (OUTPATIENT)
Dept: PAIN CLINIC | Facility: CLINIC | Age: 39
End: 2019-02-15
Payer: COMMERCIAL

## 2019-02-15 VITALS
HEART RATE: 93 BPM | OXYGEN SATURATION: 99 % | HEIGHT: 60 IN | BODY MASS INDEX: 24.15 KG/M2 | SYSTOLIC BLOOD PRESSURE: 124 MMHG | DIASTOLIC BLOOD PRESSURE: 78 MMHG | WEIGHT: 123 LBS

## 2019-02-15 DIAGNOSIS — M54.12 CERVICAL RADICULITIS: Primary | ICD-10-CM

## 2019-02-15 PROCEDURE — 99214 OFFICE O/P EST MOD 30 MIN: CPT | Performed by: NURSE PRACTITIONER

## 2019-02-15 RX ORDER — GABAPENTIN 300 MG/1
300 CAPSULE ORAL 3 TIMES DAILY
Qty: 90 CAPSULE | Refills: 0 | Status: SHIPPED | OUTPATIENT
Start: 2019-02-15 | End: 2019-04-12

## 2019-02-15 RX ORDER — METHOCARBAMOL 500 MG/1
500 TABLET, FILM COATED ORAL 4 TIMES DAILY
Qty: 120 TABLET | Refills: 0 | Status: ON HOLD | OUTPATIENT
Start: 2019-02-15 | End: 2019-03-14 | Stop reason: DRUGHIGH

## 2019-02-15 NOTE — PROGRESS NOTES
Name: Kevin Auguste   : 1980   DOS: 2/15/2019     Pain Clinic Follow Up Visit:   Kevin Auguste is a 34year old female who presents for recheck of her chronic neck pain.  Patient is c/o left neck pain that radiates down into the shoulder and down the Start gabapentin 300mg TID (300mg qHS x 3 days, then 300mg BID x 3 days then 300mg TID). 3) Start methocarbamol 500mg TID PRN. 4) Start diclofenac 50mg BID. 5) F/U in the clinic as needed. ILPM reviewed today.     Orders:No orders of the defined ty

## 2019-02-18 ENCOUNTER — HOSPITAL ENCOUNTER (OUTPATIENT)
Dept: PHYSICAL THERAPY | Facility: HOSPITAL | Age: 39
Setting detail: THERAPIES SERIES
Discharge: HOME | End: 2019-02-18
Attending: FAMILY MEDICINE
Payer: OTHER MISCELLANEOUS

## 2019-02-18 ENCOUNTER — TELEPHONE (OUTPATIENT)
Dept: SURGERY | Facility: CLINIC | Age: 39
End: 2019-02-18

## 2019-02-18 DIAGNOSIS — M54.12 CERVICAL RADICULITIS: Primary | ICD-10-CM

## 2019-02-18 PROCEDURE — 97110 THERAPEUTIC EXERCISES: CPT

## 2019-02-18 PROCEDURE — 97112 NEUROMUSCULAR REEDUCATION: CPT

## 2019-02-18 NOTE — PROGRESS NOTES
Dx: S/P right knee arthroscopy         Authorized # of Visits:  10 requested (8 approved)         Next MD visit: 2/25/19  Fall Risk: standard         Precautions: n/a             Subjective: Patient reports that her right knee pain is 0/10 when walking int 37# 2 x 10 DLS 24# 2 x 20. SLS 37# x 20 each leg. SB ROM x 10 (good) SB ROM x 10 (good)  Prone quad stretching by PT.        SB bridge, UEs at sides 2 x 10 SB bridge  - UEs at sides x 10  - arms across chest x 10 TRX  - deep squat with stretch x 10 Education regarding return to jogging program. NR SLS balance, complaint surface training, and return to higher level activity.    HEP: QS with towel roll under heel, LAQ with slow control, prone quad stretch with strap, SLS to raised seat height    Charges

## 2019-02-19 NOTE — TELEPHONE ENCOUNTER
Spoke with patient and reviewed the MRI results. Thoracic MRI unremarkable. Cervical MRI noted with bulging disc noted. Recommend NITESH x 3 on an as needed basis.  Risk and benefits of the procedure were discussed with patient and patient wanted to proceed w

## 2019-02-19 NOTE — TELEPHONE ENCOUNTER
Patient does not come up on [address] with Jose Stevens from [facility]   No PA required   Call reference number [id number]  Call time 58:02    This insurnace on file that I called let me know they are secondary no primary.  However this is the only insurnace o

## 2019-02-20 ENCOUNTER — HOSPITAL ENCOUNTER (OUTPATIENT)
Dept: PHYSICAL THERAPY | Facility: HOSPITAL | Age: 39
Setting detail: THERAPIES SERIES
Discharge: HOME | End: 2019-02-20
Attending: FAMILY MEDICINE
Payer: OTHER MISCELLANEOUS

## 2019-02-20 PROCEDURE — 97110 THERAPEUTIC EXERCISES: CPT

## 2019-02-20 PROCEDURE — 97112 NEUROMUSCULAR REEDUCATION: CPT

## 2019-02-20 NOTE — TELEPHONE ENCOUNTER
Called patient and scheduled for:    Appointment Date:  3/14/19  Procedure Time:  80 am  Check-in Time:  830 am    Appointment Date:  3/21/19  Procedure Time:  012 am  Check-in Time:  537 am    Appointment Date:  4/4/19  Procedure Time:  845 am  Check-in

## 2019-02-20 NOTE — PROGRESS NOTES
Dx: S/P right knee arthroscopy         Authorized # of Visits:  10 requested (8 approved)         Next MD visit: 2/25/19  Fall Risk: standard         Precautions: n/a             Subjective: Patient reports that she is feeling really well.  Feels that the k be independent and compliant in HEP to maintain progress made in PT. - issued and reviewed     Plan: Likely to hold or D/C from PT next visit. Do FOTO, assess goals as needed.    Date: 2/6/2019 TX#: 2/8 Date: 2/11/19   TX#: 3/8   Date: 2/13/19    TX#: 4/8 D sustained mid-range knee flexion with hip hinge to cone x 10, x 5 - airex SLS sustained mini squat with 2# catch 2 x 10  -  RTB side step with squat x 2 laps  - SLS catch x 30 with sustained mini-squat  - education return to jogging program SLS on floor wi

## 2019-02-25 ENCOUNTER — HOSPITAL ENCOUNTER (OUTPATIENT)
Dept: PHYSICAL THERAPY | Facility: HOSPITAL | Age: 39
Setting detail: THERAPIES SERIES
Discharge: HOME | End: 2019-02-25
Attending: FAMILY MEDICINE
Payer: OTHER MISCELLANEOUS

## 2019-02-25 PROCEDURE — 97110 THERAPEUTIC EXERCISES: CPT

## 2019-02-25 NOTE — PROGRESS NOTES
Discharge Summary  Dx: S/P right knee arthroscopy         Authorized # of Visits:  10 requested (8 approved)         Next MD visit: 2/25/19  Fall Risk: standard         Precautions: n/a      Pt has attended 7, cancelled 1, and no shown 0 visits in [place] and feel she is appropriate for D/C from PT per plan as listed below.      Objective:   TM jogging: able to perform at 4.5mph x 5 min without pain, min VC for hip flexion to decrease shuffling gait (2/20/19) - reports jogging over the weekend on 2.5 mile la PT, DPT      Date: 2/6/2019 TX#: 2/8 Date: 2/11/19   TX#: 3/8   Date: 2/13/19    TX#: 4/8 Date: 2/18/19    TX#: 5/8 Date: 2/20/19    TX#: 6/8 Date: 2/25/19     TX#: 7/8 Date:               TX#: 8/   Stationary bike L3 x 5 min Elliptical L5 x 5 min Elliptic pole to 12\" x 3 sets ea R/L -    - airex SLS mini squat multi-direct catch x 25  - SLS sustained mid-range knee flexion with hip hinge to cone x 10, x 5 - airex SLS sustained mini squat with 2# catch 2 x 10  -  RTB side step with squat x 2 laps  - SLS cat

## 2019-02-26 ENCOUNTER — APPOINTMENT (OUTPATIENT)
Dept: OTHER | Facility: HOSPITAL | Age: 39
End: 2019-02-26
Attending: PREVENTIVE MEDICINE
Payer: OTHER MISCELLANEOUS

## 2019-02-27 ENCOUNTER — APPOINTMENT (OUTPATIENT)
Dept: PHYSICAL THERAPY | Facility: HOSPITAL | Age: 39
End: 2019-02-27
Payer: OTHER MISCELLANEOUS

## 2019-03-04 ENCOUNTER — NURSE ONLY (OUTPATIENT)
Dept: ALLERGY | Facility: CLINIC | Age: 39
End: 2019-03-04
Payer: COMMERCIAL

## 2019-03-04 DIAGNOSIS — J30.89 ENVIRONMENTAL AND SEASONAL ALLERGIES: ICD-10-CM

## 2019-03-04 PROCEDURE — 95117 IMMUNOTHERAPY INJECTIONS: CPT | Performed by: ALLERGY & IMMUNOLOGY

## 2019-03-08 ENCOUNTER — TELEPHONE (OUTPATIENT)
Dept: SURGERY | Facility: CLINIC | Age: 39
End: 2019-03-08

## 2019-03-08 NOTE — TELEPHONE ENCOUNTER
Spoke with Nel Hudson from [facility] no pa needed for outpatient procedures.   Call reference number [id number]  Call time 1:10:09

## 2019-03-08 NOTE — TELEPHONE ENCOUNTER
Left message for patient, confirmed procedure date of 3/14/19 with Dr Caterina Nicholas and to be checked in at outpatient registration at 8:30 am.Patient instructed to call pre-procedure line before procedure at [phone].  Patient instructed to call office if there

## 2019-03-14 ENCOUNTER — TELEPHONE (OUTPATIENT)
Dept: PAIN CLINIC | Facility: CLINIC | Age: 39
End: 2019-03-14

## 2019-03-14 ENCOUNTER — HOSPITAL ENCOUNTER (OUTPATIENT)
Facility: HOSPITAL | Age: 39
Setting detail: HOSPITAL OUTPATIENT SURGERY
Discharge: HOME | End: 2019-03-14
Attending: ANESTHESIOLOGY | Admitting: ANESTHESIOLOGY
Payer: COMMERCIAL

## 2019-03-14 ENCOUNTER — APPOINTMENT (OUTPATIENT)
Dept: GENERAL RADIOLOGY | Facility: HOSPITAL | Age: 39
End: 2019-03-14
Attending: ANESTHESIOLOGY
Payer: COMMERCIAL

## 2019-03-14 VITALS
OXYGEN SATURATION: 100 % | TEMPERATURE: 98 F | SYSTOLIC BLOOD PRESSURE: 117 MMHG | DIASTOLIC BLOOD PRESSURE: 61 MMHG | RESPIRATION RATE: 18 BRPM | HEART RATE: 72 BPM

## 2019-03-14 DIAGNOSIS — M54.12 CERVICAL RADICULITIS: ICD-10-CM

## 2019-03-14 LAB
EXPIRATION DATE: NORMAL
POCT LOT NUMBER: NORMAL
POCT URINE PREGNANCY: NEGATIVE

## 2019-03-14 PROCEDURE — 99152 MOD SED SAME PHYS/QHP 5/>YRS: CPT | Performed by: ANESTHESIOLOGY

## 2019-03-14 PROCEDURE — 3E0S33Z INTRODUCTION OF ANTI-INFLAMMATORY INTO EPIDURAL SPACE, PERCUTANEOUS APPROACH: ICD-10-PCS | Performed by: ANESTHESIOLOGY

## 2019-03-14 PROCEDURE — 81025 URINE PREGNANCY TEST: CPT | Performed by: ANESTHESIOLOGY

## 2019-03-14 RX ORDER — DEXAMETHASONE SODIUM PHOSPHATE 10 MG/ML
INJECTION, SOLUTION INTRAMUSCULAR; INTRAVENOUS AS NEEDED
Status: DISCONTINUED | OUTPATIENT
Start: 2019-03-14 | End: 2019-03-14

## 2019-03-14 RX ORDER — MIDAZOLAM HYDROCHLORIDE 1 MG/ML
INJECTION INTRAMUSCULAR; INTRAVENOUS AS NEEDED
Status: DISCONTINUED | OUTPATIENT
Start: 2019-03-14 | End: 2019-03-14

## 2019-03-14 RX ORDER — SODIUM CHLORIDE, SODIUM LACTATE, POTASSIUM CHLORIDE, CALCIUM CHLORIDE 600; 310; 30; 20 MG/100ML; MG/100ML; MG/100ML; MG/100ML
100 INJECTION, SOLUTION INTRAVENOUS CONTINUOUS
Status: DISCONTINUED | OUTPATIENT
Start: 2019-03-14 | End: 2019-03-14

## 2019-03-14 RX ORDER — ONDANSETRON 2 MG/ML
4 INJECTION INTRAMUSCULAR; INTRAVENOUS ONCE AS NEEDED
Status: DISCONTINUED | OUTPATIENT
Start: 2019-03-14 | End: 2019-03-14

## 2019-03-14 RX ORDER — 0.9 % SODIUM CHLORIDE 0.9 %
VIAL (ML) INJECTION AS NEEDED
Status: DISCONTINUED | OUTPATIENT
Start: 2019-03-14 | End: 2019-03-14

## 2019-03-14 RX ORDER — LIDOCAINE HYDROCHLORIDE 10 MG/ML
INJECTION, SOLUTION EPIDURAL; INFILTRATION; INTRACAUDAL; PERINEURAL AS NEEDED
Status: DISCONTINUED | OUTPATIENT
Start: 2019-03-14 | End: 2019-03-14

## 2019-03-14 RX ORDER — DIPHENHYDRAMINE HYDROCHLORIDE 50 MG/ML
50 INJECTION INTRAMUSCULAR; INTRAVENOUS ONCE AS NEEDED
Status: DISCONTINUED | OUTPATIENT
Start: 2019-03-14 | End: 2019-03-14

## 2019-03-14 RX ORDER — METHOCARBAMOL 750 MG/1
750 TABLET, FILM COATED ORAL 3 TIMES DAILY PRN
Qty: 90 TABLET | Refills: 0 | COMMUNITY
Start: 2019-03-14 | End: 2020-02-05

## 2019-03-14 NOTE — H&P (VIEW-ONLY)
History & Physical Examination    Patient Name: Leonel Hatfield  MRN: [id number]  CSN: [id number]  YOB: 1980    Pre-Operative Diagnosis:  Cervical radiculitis [M54.12]    Present Illness: A 34year old female with neck pain is here for cervical e Other (ulcerative colitis) Mother    • Heart Disorder Maternal Grandmother    • Heart Disorder Maternal Grandfather    • Obesity Father    • Other (gerd) Father    • Other (COPD) Paternal Grandmother    • Other (ALS) Paternal Grandfather      Social Histor

## 2019-03-14 NOTE — OPERATIVE REPORT
[facility]  Operative Report  3/14/2019     [address] Patient Status:  Hospital Outpatient Surgery    1980 MRN [id number]   [facility] ENDOSCOPY Attending Jaymie Henley MD   Hosp Day # 0 PCP Willa Nunes DO     Indication: The epidural space was reached by using a loss of resistance to air technique. There was no C. S.F. or blood through the needle.  After obtaining a good epidurogram by injecting Omnipaque 180 1 mL, a combination of normal saline and dexamethasone 10 mg in to

## 2019-03-14 NOTE — TELEPHONE ENCOUNTER
I have seen this patient in the procedure area today and recommended that she increase methocarbamol to 750mg every 8 hours if needed for muscle spasm.

## 2019-03-14 NOTE — H&P
History & Physical Examination    Patient Name: Tony Vidales  MRN: [id number]  [facility]: [id number]  YOB: 1980    Pre-Operative Diagnosis:  Cervical radiculitis [M54.12]    Present Illness: A 34year old female with neck pain is here for cervical e Other (ulcerative colitis) Mother    • Heart Disorder Maternal Grandmother    • Heart Disorder Maternal Grandfather    • Obesity Father    • Other (gerd) Father    • Other (COPD) Paternal Grandmother    • Other (ALS) Paternal Grandfather      Social Histor

## 2019-03-15 ENCOUNTER — TELEPHONE (OUTPATIENT)
Dept: PAIN CLINIC | Facility: CLINIC | Age: 39
End: 2019-03-15

## 2019-03-21 ENCOUNTER — HOSPITAL ENCOUNTER (OUTPATIENT)
Facility: HOSPITAL | Age: 39
Setting detail: HOSPITAL OUTPATIENT SURGERY
Discharge: HOME | End: 2019-03-21
Attending: ANESTHESIOLOGY | Admitting: ANESTHESIOLOGY
Payer: COMMERCIAL

## 2019-03-21 ENCOUNTER — APPOINTMENT (OUTPATIENT)
Dept: GENERAL RADIOLOGY | Facility: HOSPITAL | Age: 39
End: 2019-03-21
Attending: ANESTHESIOLOGY
Payer: COMMERCIAL

## 2019-03-21 VITALS
TEMPERATURE: 98 F | RESPIRATION RATE: 18 BRPM | OXYGEN SATURATION: 100 % | SYSTOLIC BLOOD PRESSURE: 122 MMHG | HEART RATE: 57 BPM | DIASTOLIC BLOOD PRESSURE: 67 MMHG

## 2019-03-21 DIAGNOSIS — M54.12 CERVICAL RADICULITIS: ICD-10-CM

## 2019-03-21 LAB
EXPIRATION DATE: NORMAL
POCT LOT NUMBER: NORMAL
POCT URINE PREGNANCY: NEGATIVE

## 2019-03-21 PROCEDURE — 81025 URINE PREGNANCY TEST: CPT | Performed by: ANESTHESIOLOGY

## 2019-03-21 PROCEDURE — 99152 MOD SED SAME PHYS/QHP 5/>YRS: CPT | Performed by: ANESTHESIOLOGY

## 2019-03-21 PROCEDURE — 3E0S33Z INTRODUCTION OF ANTI-INFLAMMATORY INTO EPIDURAL SPACE, PERCUTANEOUS APPROACH: ICD-10-PCS | Performed by: ANESTHESIOLOGY

## 2019-03-21 RX ORDER — SODIUM CHLORIDE, SODIUM LACTATE, POTASSIUM CHLORIDE, CALCIUM CHLORIDE 600; 310; 30; 20 MG/100ML; MG/100ML; MG/100ML; MG/100ML
100 INJECTION, SOLUTION INTRAVENOUS CONTINUOUS
Status: DISCONTINUED | OUTPATIENT
Start: 2019-03-21 | End: 2019-03-21

## 2019-03-21 RX ORDER — ONDANSETRON 2 MG/ML
4 INJECTION INTRAMUSCULAR; INTRAVENOUS ONCE AS NEEDED
Status: DISCONTINUED | OUTPATIENT
Start: 2019-03-21 | End: 2019-03-21

## 2019-03-21 RX ORDER — LIDOCAINE HYDROCHLORIDE 10 MG/ML
INJECTION, SOLUTION EPIDURAL; INFILTRATION; INTRACAUDAL; PERINEURAL AS NEEDED
Status: DISCONTINUED | OUTPATIENT
Start: 2019-03-21 | End: 2019-03-21

## 2019-03-21 RX ORDER — DIPHENHYDRAMINE HYDROCHLORIDE 50 MG/ML
50 INJECTION INTRAMUSCULAR; INTRAVENOUS ONCE AS NEEDED
Status: DISCONTINUED | OUTPATIENT
Start: 2019-03-21 | End: 2019-03-21

## 2019-03-21 RX ORDER — DEXAMETHASONE SODIUM PHOSPHATE 10 MG/ML
INJECTION, SOLUTION INTRAMUSCULAR; INTRAVENOUS AS NEEDED
Status: DISCONTINUED | OUTPATIENT
Start: 2019-03-21 | End: 2019-03-21

## 2019-03-21 RX ORDER — MIDAZOLAM HYDROCHLORIDE 1 MG/ML
INJECTION INTRAMUSCULAR; INTRAVENOUS AS NEEDED
Status: DISCONTINUED | OUTPATIENT
Start: 2019-03-21 | End: 2019-03-21

## 2019-03-21 RX ORDER — 0.9 % SODIUM CHLORIDE 0.9 %
VIAL (ML) INJECTION AS NEEDED
Status: DISCONTINUED | OUTPATIENT
Start: 2019-03-21 | End: 2019-03-21

## 2019-03-21 NOTE — OPERATIVE REPORT
[facility]  Operative Report  3/21/2019     [address] Patient Status:  Hospital Outpatient Surgery    1980 MRN [id number]   [facility] ENDOSCOPY Attending No att. providers found   Hosp Day # 0 PCP DO Stanford Wen 20-gauge Tuohy needle was introduced and advanced under fluoroscopy at C6-C7 level. The epidural space was reached by using a loss of resistance to air technique. There was no C. S.F. or blood through the needle.  After obtaining a good epidurogram by injec

## 2019-03-21 NOTE — INTERVAL H&P NOTE
Pre-op Diagnosis: Cervical radiculitis [M54.12]    The above referenced H&P was reviewed by MARTINA Fields on 3/21/2019, the patient was examined and no significant changes have occurred in the patient's condition since the H&P was performed.   I discuss

## 2019-04-01 ENCOUNTER — NURSE ONLY (OUTPATIENT)
Dept: ALLERGY | Facility: CLINIC | Age: 39
End: 2019-04-01
Payer: COMMERCIAL

## 2019-04-01 DIAGNOSIS — J30.89 ENVIRONMENTAL AND SEASONAL ALLERGIES: ICD-10-CM

## 2019-04-01 PROCEDURE — 95117 IMMUNOTHERAPY INJECTIONS: CPT | Performed by: ALLERGY & IMMUNOLOGY

## 2019-04-01 PROCEDURE — 95165 ANTIGEN THERAPY SERVICES: CPT | Performed by: ALLERGY & IMMUNOLOGY

## 2019-04-12 ENCOUNTER — TELEPHONE (OUTPATIENT)
Dept: SURGERY | Facility: CLINIC | Age: 39
End: 2019-04-12

## 2019-04-12 ENCOUNTER — OFFICE VISIT (OUTPATIENT)
Dept: PAIN CLINIC | Facility: CLINIC | Age: 39
End: 2019-04-12
Payer: COMMERCIAL

## 2019-04-12 VITALS
SYSTOLIC BLOOD PRESSURE: 124 MMHG | HEIGHT: 60 IN | DIASTOLIC BLOOD PRESSURE: 82 MMHG | WEIGHT: 122 LBS | BODY MASS INDEX: 23.95 KG/M2

## 2019-04-12 DIAGNOSIS — M54.2 CERVICALGIA: Primary | ICD-10-CM

## 2019-04-12 PROCEDURE — 99214 OFFICE O/P EST MOD 30 MIN: CPT | Performed by: ANESTHESIOLOGY

## 2019-04-12 RX ORDER — LIOTHYRONINE SODIUM 5 UG/1
TABLET ORAL
Refills: 3 | COMMUNITY
Start: 2019-03-22 | End: 2020-02-05

## 2019-04-12 RX ORDER — OXYCODONE AND ACETAMINOPHEN 10; 325 MG/1; MG/1
1 TABLET ORAL 2 TIMES DAILY PRN
Qty: 60 TABLET | Refills: 0 | Status: SHIPPED | OUTPATIENT
Start: 2019-04-12 | End: 2019-04-18

## 2019-04-12 NOTE — TELEPHONE ENCOUNTER
Pt filled rx of Percocet 10, should be for #60 but insurance would only allow one week, so they dispensed 14 instead.

## 2019-04-16 ENCOUNTER — OFFICE VISIT (OUTPATIENT)
Dept: ALLERGY | Facility: CLINIC | Age: 39
End: 2019-04-16
Payer: COMMERCIAL

## 2019-04-16 VITALS
HEART RATE: 67 BPM | RESPIRATION RATE: 17 BRPM | OXYGEN SATURATION: 99 % | SYSTOLIC BLOOD PRESSURE: 113 MMHG | DIASTOLIC BLOOD PRESSURE: 69 MMHG | TEMPERATURE: 99 F

## 2019-04-16 DIAGNOSIS — L30.9 DERMATITIS: Primary | ICD-10-CM

## 2019-04-16 RX ORDER — DIPHENHYDRAMINE HCL 50 MG
50 CAPSULE ORAL EVERY 6 HOURS PRN
COMMUNITY
End: 2020-04-08

## 2019-04-16 RX ORDER — FEXOFENADINE HCL 180 MG/1
180 TABLET ORAL DAILY
COMMUNITY
End: 2020-02-05

## 2019-04-16 RX ORDER — PREDNISONE 10 MG/1
TABLET ORAL
Qty: 15 TABLET | Refills: 0 | Status: SHIPPED | OUTPATIENT
Start: 2019-04-16 | End: 2020-04-08

## 2019-04-16 NOTE — PATIENT INSTRUCTIONS
Differential includes contact dermatitis versus potential viral rash versus internal process given her history of thyroid dysfunction    Recs: Trial of prednisone 10 mg twice a day over the next 5 days  May titrate Allegra 180 mg up to 2-3 times per day  T

## 2019-04-16 NOTE — PROGRESS NOTES
Carlos Reeder is a 34year old female. HPI:   Patient presents with: Allergic Rxn Allergies (immune): Pt reports on Saturday she started itching. On Sunday she notieced a rash, on monday both ears were swollen, and facial redness appeared.  This morning EPIDURAL STEROID INJECTION N/A 3/14/2019    Performed by Jaden Kay MD at [facility] ENDOSCOPY   • CHOLECYSTECTOMY     • COLPOSCOPY, CERVIX W/UPPER ADJACENT VAGINA; W/BIOPSY(S), CERVIX     • EXCISION TURBINATE,SUBMUCOUS  02/01/2018   • INJECTION (IA)  1/7/1 tablet by mouth 2 (two) times daily as needed for Pain. Disp: 60 tablet Rfl: 0   Fluticasone Propionate (FLONASE NA) by Nasal route.  Disp:  Rfl:    methocarbamol 750 MG Oral Tab Take 1 tablet (750 mg total) by mouth 3 (three) times daily as needed (for mus process given her history of thyroid dysfunction    Recs: Trial of prednisone 10 mg twice a day over the next 5 days  May titrate Allegra 180 mg up to 2-3 times per day  Trial of moisturizer with Vanicream  Recommended to rescreen thyroid labs as patient r

## 2019-04-18 ENCOUNTER — TELEPHONE (OUTPATIENT)
Dept: PAIN CLINIC | Facility: CLINIC | Age: 39
End: 2019-04-18

## 2019-04-18 NOTE — TELEPHONE ENCOUNTER
Called patient and left message to notify that her script for 7 days is ready for pick-up at the . Per Elodia Reynoso, a prior authorization request will be started. Call back number provided should patient have any questions or concerns.

## 2019-04-18 NOTE — TELEPHONE ENCOUNTER
Regardinmo/fell down stairs  ----- Message from Miriam Tijerina sent at 3/30/2019 11:16 PM CDT -----  Patient Name: Nikko Madrid  Specialist or PCP:Antelmo  Pregnant (If Yes, how long?):NA  Symptoms:fell down stairs, golf ball lump on his head   Call Back #:[phone]  Is the patient’s permanent residence located in [place], [place], or a [facility] Yes [facility] [address]  Call Center Account #:[id number]       Rx ready for pick-up

## 2019-04-18 NOTE — TELEPHONE ENCOUNTER
Patient's recent prescription denied for 30 day fill, patient only able to fill 7 day supply. Prior authorization attempt for full coverage to be made.

## 2019-04-18 NOTE — TELEPHONE ENCOUNTER
Please see note from Patient below and advise. Thank you. \" Patient Comment: Paulo Hart and Staff, as it turns out, insurance does only cover a 7-day supply at a time.  I am wondering if you would consider increasing the sig for the next refill?

## 2019-04-19 NOTE — TELEPHONE ENCOUNTER
Pt here in office for Rx pickup. States last dose of Percocet on Wednesday, April 17th at 7pm.  Pt informed of need for urine specimen today. Pt states lab downstairs does not accept pt's insurance. Informed pt provider will order for external lab.   [employer]

## 2019-04-29 ENCOUNTER — TELEPHONE (OUTPATIENT)
Dept: SURGERY | Facility: CLINIC | Age: 39
End: 2019-04-29

## 2019-04-30 ENCOUNTER — NURSE ONLY (OUTPATIENT)
Dept: ALLERGY | Facility: CLINIC | Age: 39
End: 2019-04-30
Payer: COMMERCIAL

## 2019-04-30 DIAGNOSIS — J30.89 ENVIRONMENTAL AND SEASONAL ALLERGIES: ICD-10-CM

## 2019-04-30 PROCEDURE — 95117 IMMUNOTHERAPY INJECTIONS: CPT | Performed by: ALLERGY & IMMUNOLOGY

## 2019-05-09 RX ORDER — OXYCODONE AND ACETAMINOPHEN 10; 325 MG/1; MG/1
1 TABLET ORAL EVERY 12 HOURS PRN
Qty: 60 TABLET | Refills: 0 | Status: SHIPPED | OUTPATIENT
Start: 2019-05-09 | End: 2019-06-13

## 2019-05-09 RX ORDER — METHOCARBAMOL 750 MG/1
750 TABLET, FILM COATED ORAL 3 TIMES DAILY PRN
Qty: 90 TABLET | Refills: 0 | OUTPATIENT
Start: 2019-05-09

## 2019-05-09 NOTE — TELEPHONE ENCOUNTER
Response to request for coverage of Percocet 10-325mg received from [facility]:    ? Information regarding your request:  Drug is covered by current benefit plan. No further PA activity needed.

## 2019-05-09 NOTE — TELEPHONE ENCOUNTER
Medication: oxyCODONE-acetaminophen  MG Oral Tab    Date of last refill: 4/18/19  [place] 9/38/64    Last office visit: 4/12/19  Due back to clinic per last office note: \"She can certainly call the clinic should s discussed with her medication management options. Given her age, is highly advised against long-term use of narcotic medications. However, the patient is working full-time and has a small child at home.   She denotes good relief and increase function with

## 2019-05-09 NOTE — TELEPHONE ENCOUNTER
Rx Request  methocarbamol 750 MG Oral Tab    Disp:     90               R: 0    Last Visit: 01/29/2019    Last Refilled: 03/14/2019

## 2019-05-29 ENCOUNTER — NURSE ONLY (OUTPATIENT)
Dept: ALLERGY | Facility: CLINIC | Age: 39
End: 2019-05-29
Payer: COMMERCIAL

## 2019-05-29 DIAGNOSIS — J30.89 ENVIRONMENTAL AND SEASONAL ALLERGIES: ICD-10-CM

## 2019-05-29 PROCEDURE — 95117 IMMUNOTHERAPY INJECTIONS: CPT | Performed by: ALLERGY & IMMUNOLOGY

## 2019-06-13 DIAGNOSIS — M54.2 CERVICALGIA: Primary | ICD-10-CM

## 2019-06-13 RX ORDER — OXYCODONE AND ACETAMINOPHEN 10; 325 MG/1; MG/1
1 TABLET ORAL EVERY 12 HOURS PRN
Qty: 60 TABLET | Refills: 0 | Status: SHIPPED | OUTPATIENT
Start: 2019-06-13 | End: 2020-04-08

## 2019-06-13 RX ORDER — OXYCODONE AND ACETAMINOPHEN 10; 325 MG/1; MG/1
1 TABLET ORAL EVERY 12 HOURS PRN
Qty: 60 TABLET | Refills: 0 | Status: SHIPPED | OUTPATIENT
Start: 2019-06-13 | End: 2019-06-13

## 2019-06-13 RX ORDER — METHOCARBAMOL 750 MG/1
750 TABLET, FILM COATED ORAL 3 TIMES DAILY PRN
Qty: 90 TABLET | Refills: 0 | OUTPATIENT
Start: 2019-06-13

## 2019-06-13 NOTE — TELEPHONE ENCOUNTER
Has her UDS results been scanned in to Epic? Please check on this. I see it was endorsed to Dr. Gibson Guzman on 4/26/19  Thank you.

## 2019-06-14 NOTE — TELEPHONE ENCOUNTER
UDS was forwarded for me to review. Is from outside lab. Will attempt to find him so we can scan it into the record. However, from my recollection, his UDS is appropriate. We can have her refax.

## 2019-06-20 NOTE — TELEPHONE ENCOUNTER
Faxed Rx for Custom Medication  CMPD Diclofenac 0.15%, Lidocaine 2.25%, Prilocaine 2.25% TC (1536)   Apply 4 grams to affected area three to four times daily for treatment of pain.  Dispense one 480 gram tube for 30 day supply.      Route:   (none)       To

## 2019-06-20 NOTE — TELEPHONE ENCOUNTER
Patient will have UDS results refaxed to us.     Patient would like to explore utilizing a compound cream.    Currently reports the following:    Neck and trapz pain  5/10 current pain level    Last reported dose oxycodone \"a couple of weeks ago\"

## 2019-06-25 ENCOUNTER — NURSE ONLY (OUTPATIENT)
Dept: ALLERGY | Facility: CLINIC | Age: 39
End: 2019-06-25
Payer: COMMERCIAL

## 2019-06-25 DIAGNOSIS — J30.89 ENVIRONMENTAL AND SEASONAL ALLERGIES: ICD-10-CM

## 2019-06-25 PROCEDURE — 95117 IMMUNOTHERAPY INJECTIONS: CPT | Performed by: ALLERGY & IMMUNOLOGY

## 2019-06-25 PROCEDURE — 95165 ANTIGEN THERAPY SERVICES: CPT | Performed by: ALLERGY & IMMUNOLOGY

## 2019-06-26 NOTE — TELEPHONE ENCOUNTER
Received fax from [facility] Kranthi Marie stating patient's original medication not covered by Insurance.  Cash options include-   Flurbiprofen 10%, Cyclebenazprine 1% in Lipoderm (5457)  Dispense #120 grams  Apply 2 to 4 grams three to four times daily for treatment of

## 2019-07-24 ENCOUNTER — NURSE ONLY (OUTPATIENT)
Dept: ALLERGY | Facility: CLINIC | Age: 39
End: 2019-07-24
Payer: COMMERCIAL

## 2019-07-24 DIAGNOSIS — J30.89 ENVIRONMENTAL AND SEASONAL ALLERGIES: ICD-10-CM

## 2019-07-24 PROCEDURE — 95165 ANTIGEN THERAPY SERVICES: CPT | Performed by: ALLERGY & IMMUNOLOGY

## 2019-07-24 PROCEDURE — 95117 IMMUNOTHERAPY INJECTIONS: CPT | Performed by: ALLERGY & IMMUNOLOGY

## 2019-08-21 ENCOUNTER — NURSE ONLY (OUTPATIENT)
Dept: ALLERGY | Facility: CLINIC | Age: 39
End: 2019-08-21
Payer: COMMERCIAL

## 2019-08-21 DIAGNOSIS — J30.89 ENVIRONMENTAL AND SEASONAL ALLERGIES: ICD-10-CM

## 2019-08-21 PROCEDURE — 95117 IMMUNOTHERAPY INJECTIONS: CPT | Performed by: ALLERGY & IMMUNOLOGY

## 2019-09-17 ENCOUNTER — NURSE ONLY (OUTPATIENT)
Dept: ALLERGY | Facility: CLINIC | Age: 39
End: 2019-09-17
Payer: COMMERCIAL

## 2019-09-17 DIAGNOSIS — J30.89 ENVIRONMENTAL AND SEASONAL ALLERGIES: ICD-10-CM

## 2019-09-17 PROCEDURE — 95117 IMMUNOTHERAPY INJECTIONS: CPT | Performed by: ALLERGY & IMMUNOLOGY

## 2019-09-24 ENCOUNTER — PATIENT MESSAGE (OUTPATIENT)
Dept: ALLERGY | Facility: CLINIC | Age: 39
End: 2019-09-24

## 2019-09-24 RX ORDER — ALBUTEROL SULFATE 90 UG/1
AEROSOL, METERED RESPIRATORY (INHALATION)
Qty: 1 INHALER | Refills: 0 | Status: SHIPPED | OUTPATIENT
Start: 2019-09-24 | End: 2020-04-08 | Stop reason: ALTCHOICE

## 2019-09-24 NOTE — TELEPHONE ENCOUNTER
Discussed message with patient. She verbalizes her understanding and is agreeable to plan of care. No further questions at this time.

## 2019-09-24 NOTE — TELEPHONE ENCOUNTER
From: Wenceslao Quan  To: Jolanta Vanegas MD  Sent: 2019 5:46 PM CDT  Subject: Prescription Question    Hello,     I would like to request a new albuterol inhaler to have on hand for a cough I have right now. I have an old Respiclick that is .

## 2019-09-30 ENCOUNTER — TELEPHONE (OUTPATIENT)
Dept: ALLERGY | Facility: CLINIC | Age: 39
End: 2019-09-30

## 2019-09-30 NOTE — TELEPHONE ENCOUNTER
Call reviewed and noted. Continue with current medications and supportive measures.   Would recommend follow-up appointment if patient is still symptomatic to see if other treatment modalities including steroids or antibiotics would be warranted

## 2019-09-30 NOTE — TELEPHONE ENCOUNTER
Pt calling to report a dry cough that keeps her up at night. Non productive  No fevers. Started Wednesday with her inhaler. Started to bother her on Thursday. Pt is taking her albuterol every 4-6 hours. Only helps sometimes.    Pt is taking Delsum cough

## 2019-10-01 ENCOUNTER — OFFICE VISIT (OUTPATIENT)
Dept: ALLERGY | Facility: CLINIC | Age: 39
End: 2019-10-01
Payer: COMMERCIAL

## 2019-10-01 VITALS
TEMPERATURE: 98 F | SYSTOLIC BLOOD PRESSURE: 157 MMHG | OXYGEN SATURATION: 99 % | DIASTOLIC BLOOD PRESSURE: 73 MMHG | HEART RATE: 61 BPM

## 2019-10-01 DIAGNOSIS — R05.9 COUGH: Primary | ICD-10-CM

## 2019-10-01 DIAGNOSIS — J30.89 ENVIRONMENTAL AND SEASONAL ALLERGIES: ICD-10-CM

## 2019-10-01 RX ORDER — CODEINE PHOSPHATE AND GUAIFENESIN 10; 100 MG/5ML; MG/5ML
10 SOLUTION ORAL EVERY 6 HOURS PRN
Qty: 180 ML | Refills: 0 | Status: SHIPPED | OUTPATIENT
Start: 2019-10-01 | End: 2019-10-21

## 2019-10-01 NOTE — PATIENT INSTRUCTIONS
Recs: Start trial of Alvesco 80 mcg 2 puffs twice a day. Rinse mouth or brush teeth after using  Trial of cheratussin ac 5-10 ml once a night at bedtime and every 6 hours as needed.   Reviewed with patient that Cheratussin doescontain codeine/narcotic prod

## 2019-10-01 NOTE — PROGRESS NOTES
Jose Guerrero is a 37year old female. HPI:   Patient presents with:  Cough: Cough started Wednesday, got worse on Thursday and lingered throught the weekend. Now pt being woken up during the night coughing. No fevers. Some PND. No congestion.  Pt was ta MD ROBIN at [facility] MAIN OR   • KNEE SCOPE,MED+LAT MENIS REPAIR Left    • OTHER SURGICAL HISTORY Right 01/2019    meniscus repair of right knee   • REPAIR OF NASAL SEPTUM  02/01/2018   • REPAIR ROTATOR CUFF,ACUTE      RIGHT ROTATOR CUFF      Family History   Probl mouth. Disp:  Rfl:    NON FORMULARY  Disp:  Rfl:    diphenhydrAMINE HCl 50 MG Oral Cap Take 50 mg by mouth every 6 (six) hours as needed for Itching.  Disp:  Rfl:    predniSONE 10 MG Oral Tab Take 3 tabs(30 mg) with food once a day x 5 days Disp: 15 tablet non-tender non-distended  Skin/Hair: no unusual rashes present   Extremities: no edema, cyanosis, or clubbing     ASSESSMENT/PLAN:   Assessment   Cough  (primary encounter diagnosis)  Environmental and seasonal allergies    Spirometry today shows an FEV1 9

## 2019-10-14 ENCOUNTER — NURSE ONLY (OUTPATIENT)
Dept: ALLERGY | Facility: CLINIC | Age: 39
End: 2019-10-14
Payer: COMMERCIAL

## 2019-10-14 DIAGNOSIS — J30.89 ENVIRONMENTAL AND SEASONAL ALLERGIES: ICD-10-CM

## 2019-10-14 PROCEDURE — 95117 IMMUNOTHERAPY INJECTIONS: CPT | Performed by: ALLERGY & IMMUNOLOGY

## 2019-10-14 PROCEDURE — 95165 ANTIGEN THERAPY SERVICES: CPT | Performed by: ALLERGY & IMMUNOLOGY

## 2019-10-21 RX ORDER — PREDNISONE 20 MG/1
TABLET ORAL
Qty: 10 TABLET | Refills: 0 | Status: SHIPPED | OUTPATIENT
Start: 2019-10-21 | End: 2020-02-05

## 2019-10-21 RX ORDER — CODEINE PHOSPHATE AND GUAIFENESIN 10; 100 MG/5ML; MG/5ML
10 SOLUTION ORAL EVERY 6 HOURS PRN
Qty: 180 ML | Refills: 0 | Status: SHIPPED | OUTPATIENT
Start: 2019-10-21 | End: 2020-02-05

## 2019-10-21 RX ORDER — ALBUTEROL SULFATE 90 UG/1
2 AEROSOL, METERED RESPIRATORY (INHALATION) EVERY 6 HOURS PRN
Qty: 1 INHALER | Refills: 0 | Status: SHIPPED | OUTPATIENT
Start: 2019-10-21 | End: 2020-02-05

## 2019-10-31 ENCOUNTER — HOSPITAL ENCOUNTER (OUTPATIENT)
Dept: MAMMOGRAPHY | Age: 39
Discharge: HOME | End: 2019-10-31
Attending: FAMILY MEDICINE
Payer: COMMERCIAL

## 2019-10-31 DIAGNOSIS — Z12.39 SCREENING BREAST EXAMINATION: ICD-10-CM

## 2019-10-31 PROCEDURE — 77063 BREAST TOMOSYNTHESIS BI: CPT | Performed by: FAMILY MEDICINE

## 2019-10-31 PROCEDURE — 77067 SCR MAMMO BI INCL CAD: CPT | Performed by: FAMILY MEDICINE

## 2019-11-06 ENCOUNTER — PATIENT MESSAGE (OUTPATIENT)
Dept: ALLERGY | Facility: CLINIC | Age: 39
End: 2019-11-06

## 2019-11-06 RX ORDER — BUDESONIDE AND FORMOTEROL FUMARATE DIHYDRATE 80; 4.5 UG/1; UG/1
2 AEROSOL RESPIRATORY (INHALATION) 2 TIMES DAILY
Qty: 1 INHALER | Refills: 2 | Status: SHIPPED | OUTPATIENT
Start: 2019-11-06 | End: 2019-12-01

## 2019-11-06 NOTE — TELEPHONE ENCOUNTER
From: Cristiano Keenan  To: Candy Owusu MD  Sent: 11/6/2019 9:38 AM CST  Subject: Prescription Question    Good morning. I am just about out of my Alvesco, would like to continue on an inhaler for a bit until the cough is resolved.  I would say it is abo

## 2019-11-06 NOTE — TELEPHONE ENCOUNTER
Spoke with patient. Verified name and  and informed patient per Dr. Galdamez Simple prescription has been sent for Symbicort. Patient verbalizes understanding.

## 2019-11-11 ENCOUNTER — NURSE ONLY (OUTPATIENT)
Dept: ALLERGY | Facility: CLINIC | Age: 39
End: 2019-11-11
Payer: COMMERCIAL

## 2019-11-11 DIAGNOSIS — J30.89 ENVIRONMENTAL AND SEASONAL ALLERGIES: ICD-10-CM

## 2019-11-11 PROCEDURE — 95117 IMMUNOTHERAPY INJECTIONS: CPT | Performed by: ALLERGY & IMMUNOLOGY

## 2019-11-11 PROCEDURE — 95165 ANTIGEN THERAPY SERVICES: CPT | Performed by: ALLERGY & IMMUNOLOGY

## 2019-11-14 ENCOUNTER — PATIENT MESSAGE (OUTPATIENT)
Dept: ALLERGY | Facility: CLINIC | Age: 39
End: 2019-11-14

## 2019-11-14 RX ORDER — MONTELUKAST SODIUM 10 MG/1
10 TABLET ORAL EVERY EVENING
Qty: 90 TABLET | Refills: 1 | Status: SHIPPED | OUTPATIENT
Start: 2019-11-14 | End: 2020-04-08

## 2019-11-14 NOTE — TELEPHONE ENCOUNTER
Refill requested for Montelukast 10 mg tablets    Last office visit: 10/1/2019    Previously advised to follow up in follow-up in 1 week if not improving     F/U currently scheduled? No    ACTION: Routed to Dr. Lorena Suggs for review.

## 2019-11-14 NOTE — TELEPHONE ENCOUNTER
From: Carlos Reeder  To: Bere Elias MD  Sent: 11/14/2019 10:08 AM CST  Subject: Prescription Question    Hello Everyone! I would like to request a refill of my old Singulair Rx, that is no longer listed as [place]. \" Restarted rx about 3 weeks a

## 2019-12-02 RX ORDER — BUDESONIDE AND FORMOTEROL FUMARATE DIHYDRATE 80; 4.5 UG/1; UG/1
AEROSOL RESPIRATORY (INHALATION)
Qty: 10.2 INHALER | Refills: 2 | Status: SHIPPED | OUTPATIENT
Start: 2019-12-02 | End: 2020-04-08 | Stop reason: ALTCHOICE

## 2019-12-02 NOTE — TELEPHONE ENCOUNTER
Pt last seen in Allergy 10/1/2019 for . . .    Cough  (primary encounter diagnosis)  Environmental and seasonal allergies    Refill request received for .  . .    Budesonide-Formoterol Fumarate 80-4.5 MCG/ACT Inhalation Aerosol 1 Inhaler 2 11/6/2019    Sig:

## 2019-12-10 ENCOUNTER — NURSE ONLY (OUTPATIENT)
Dept: ALLERGY | Facility: CLINIC | Age: 39
End: 2019-12-10
Payer: COMMERCIAL

## 2019-12-10 DIAGNOSIS — J30.89 ENVIRONMENTAL AND SEASONAL ALLERGIES: ICD-10-CM

## 2019-12-10 PROCEDURE — 95117 IMMUNOTHERAPY INJECTIONS: CPT | Performed by: ALLERGY & IMMUNOLOGY

## 2020-01-07 ENCOUNTER — NURSE ONLY (OUTPATIENT)
Dept: ALLERGY | Facility: CLINIC | Age: 40
End: 2020-01-07
Payer: COMMERCIAL

## 2020-01-07 DIAGNOSIS — J30.89 ENVIRONMENTAL AND SEASONAL ALLERGIES: ICD-10-CM

## 2020-01-07 PROCEDURE — 95117 IMMUNOTHERAPY INJECTIONS: CPT | Performed by: ALLERGY & IMMUNOLOGY

## 2020-02-04 ENCOUNTER — NURSE ONLY (OUTPATIENT)
Dept: ALLERGY | Facility: CLINIC | Age: 40
End: 2020-02-04
Payer: COMMERCIAL

## 2020-02-04 DIAGNOSIS — J30.89 ENVIRONMENTAL AND SEASONAL ALLERGIES: ICD-10-CM

## 2020-02-04 PROCEDURE — 95117 IMMUNOTHERAPY INJECTIONS: CPT | Performed by: ALLERGY & IMMUNOLOGY

## 2020-02-04 PROCEDURE — 95165 ANTIGEN THERAPY SERVICES: CPT | Performed by: ALLERGY & IMMUNOLOGY

## 2020-02-04 RX ORDER — KETOCONAZOLE 20 MG/G
CREAM TOPICAL
Qty: 60 G | Refills: 0 | Status: SHIPPED | OUTPATIENT
Start: 2020-02-04 | End: 2020-07-30 | Stop reason: ALTCHOICE

## 2020-02-11 ENCOUNTER — PATIENT MESSAGE (OUTPATIENT)
Dept: ALLERGY | Facility: CLINIC | Age: 40
End: 2020-02-11

## 2020-02-11 RX ORDER — FEXOFENADINE HCL AND PSEUDOEPHEDRINE HCI 60; 120 MG/1; MG/1
1 TABLET, EXTENDED RELEASE ORAL EVERY 12 HOURS
Qty: 60 TABLET | Refills: 5 | Status: SHIPPED
Start: 2020-02-11 | End: 2020-07-30 | Stop reason: ALTCHOICE

## 2020-02-11 NOTE — TELEPHONE ENCOUNTER
From: Mariluz Del Rio  To: Maggie Mi MD  Sent: 2/11/2020 3:26 PM CST  Subject: Prescription Question    Hello, may I please try a script for Allergra-D, please? I would like to see if insurance will cover. Thank you!     Jareth Rios

## 2020-02-11 NOTE — TELEPHONE ENCOUNTER
Refill requested for Cora ROWLEY    Last office visit: 10/1/2019     Previously advised to follow up in no timeline listed in last office visit    F/U currently scheduled? No    ACTION: Routed to Dr. Jeison Mao for review.

## 2020-03-02 ENCOUNTER — TELEPHONE (OUTPATIENT)
Dept: ALLERGY | Facility: CLINIC | Age: 40
End: 2020-03-02

## 2020-03-02 ENCOUNTER — NURSE ONLY (OUTPATIENT)
Dept: ALLERGY | Facility: CLINIC | Age: 40
End: 2020-03-02
Payer: COMMERCIAL

## 2020-03-02 DIAGNOSIS — J30.89 ENVIRONMENTAL AND SEASONAL ALLERGIES: ICD-10-CM

## 2020-03-02 PROCEDURE — 95165 ANTIGEN THERAPY SERVICES: CPT | Performed by: ALLERGY & IMMUNOLOGY

## 2020-03-02 PROCEDURE — 95117 IMMUNOTHERAPY INJECTIONS: CPT | Performed by: ALLERGY & IMMUNOLOGY

## 2020-03-02 NOTE — TELEPHONE ENCOUNTER
Reviewed and noted. Patient assessed. Agree with triage advice provided. Patient observed over 1 hour with resolution of symptoms.   Decrease immunotherapy dose by 1 dose

## 2020-04-01 ENCOUNTER — NURSE ONLY (OUTPATIENT)
Dept: ALLERGY | Facility: CLINIC | Age: 40
End: 2020-04-01
Payer: COMMERCIAL

## 2020-04-01 DIAGNOSIS — J30.89 ENVIRONMENTAL AND SEASONAL ALLERGIES: ICD-10-CM

## 2020-04-01 PROCEDURE — 95117 IMMUNOTHERAPY INJECTIONS: CPT | Performed by: ALLERGY & IMMUNOLOGY

## 2020-04-07 ENCOUNTER — TELEPHONE (OUTPATIENT)
Dept: ALLERGY | Facility: CLINIC | Age: 40
End: 2020-04-07

## 2020-04-08 ENCOUNTER — OFFICE VISIT (OUTPATIENT)
Dept: INTERNAL MEDICINE CLINIC | Facility: CLINIC | Age: 40
End: 2020-04-08
Payer: COMMERCIAL

## 2020-04-08 VITALS
HEART RATE: 80 BPM | DIASTOLIC BLOOD PRESSURE: 84 MMHG | WEIGHT: 135 LBS | HEIGHT: 60 IN | BODY MASS INDEX: 26.5 KG/M2 | SYSTOLIC BLOOD PRESSURE: 135 MMHG

## 2020-04-08 DIAGNOSIS — Z72.820 POOR SLEEP: Primary | ICD-10-CM

## 2020-04-08 PROCEDURE — 99203 OFFICE O/P NEW LOW 30 MIN: CPT | Performed by: INTERNAL MEDICINE

## 2020-04-08 RX ORDER — ZOLPIDEM TARTRATE 5 MG/1
5 TABLET ORAL NIGHTLY PRN
Qty: 30 TABLET | Refills: 3 | Status: SHIPPED | OUTPATIENT
Start: 2020-04-08 | End: 2020-10-01

## 2020-04-08 RX ORDER — SULFAMETHOXAZOLE AND TRIMETHOPRIM 800; 160 MG/1; MG/1
TABLET ORAL
COMMUNITY
Start: 2020-03-29 | End: 2020-04-08

## 2020-04-08 NOTE — PROGRESS NOTES
Ingris Winn is a 37year old female.   Patient presents with:  Establish Care      HPI:   Pt comes as a new pt   c/c establish care   C/o poor sleep -takes [place] --makes it twice a week -- last took it one yr ago   Takes melatonin or benadryl but it make Smokeless tobacco: Never Used    Alcohol use:  Yes      Alcohol/week: 4.0 standard drinks      Types: 4 Standard drinks or equivalent per week    Drug use: No      Comment: hemp oil        REVIEW OF SYSTEMS:   GENERAL HEALTH: No fevers, chills, sweats, fati

## 2020-04-09 ENCOUNTER — TELEMEDICINE (OUTPATIENT)
Dept: ALLERGY | Facility: CLINIC | Age: 40
End: 2020-04-09

## 2020-04-09 DIAGNOSIS — J30.1 SEASONAL ALLERGIC RHINITIS DUE TO POLLEN: Primary | ICD-10-CM

## 2020-04-13 ENCOUNTER — NURSE ONLY (OUTPATIENT)
Dept: ALLERGY | Facility: CLINIC | Age: 40
End: 2020-04-13
Payer: COMMERCIAL

## 2020-04-13 DIAGNOSIS — L30.9 DERMATITIS: ICD-10-CM

## 2020-04-13 PROCEDURE — 95044 PATCH/APPLICATION TESTS: CPT | Performed by: ALLERGY & IMMUNOLOGY

## 2020-04-13 NOTE — PROGRESS NOTES
Cleaned off upper and lower back with alcohol wipe and let dry. Applied patch test panel #1 left upper back vertically; panel #2 to uppermiddle back vertically; panel #3 right upper back.   Marked notch and corners with surgical marker and secured with pa

## 2020-04-28 ENCOUNTER — NURSE ONLY (OUTPATIENT)
Dept: ALLERGY | Facility: CLINIC | Age: 40
End: 2020-04-28
Payer: COMMERCIAL

## 2020-04-28 ENCOUNTER — PATIENT MESSAGE (OUTPATIENT)
Dept: ALLERGY | Facility: CLINIC | Age: 40
End: 2020-04-28

## 2020-04-28 DIAGNOSIS — J30.89 ENVIRONMENTAL AND SEASONAL ALLERGIES: ICD-10-CM

## 2020-04-28 PROCEDURE — 95117 IMMUNOTHERAPY INJECTIONS: CPT | Performed by: ALLERGY & IMMUNOLOGY

## 2020-04-28 RX ORDER — ALBUTEROL SULFATE 90 UG/1
AEROSOL, METERED RESPIRATORY (INHALATION)
Qty: 1 INHALER | Refills: 0 | Status: SHIPPED | OUTPATIENT
Start: 2020-04-28 | End: 2020-10-27

## 2020-04-28 NOTE — TELEPHONE ENCOUNTER
From: Dorene Morin  To: William Tafoya MD  Sent: 4/28/2020 8:48 AM CDT  Subject: Prescription Question    Good morning, I would like to request a refill for an Albuterol inhaler, please.  I had removed it from my list of medications, so was unable to pr

## 2020-04-29 ENCOUNTER — TELEPHONE (OUTPATIENT)
Dept: INTERNAL MEDICINE CLINIC | Facility: HOSPITAL | Age: 40
End: 2020-04-29

## 2020-04-29 ENCOUNTER — LAB ENCOUNTER (OUTPATIENT)
Dept: LAB | Facility: HOSPITAL | Age: 40
End: 2020-04-29
Attending: PREVENTIVE MEDICINE
Payer: COMMERCIAL

## 2020-04-29 DIAGNOSIS — Z20.822 SUSPECTED 2019 NOVEL CORONAVIRUS INFECTION: Primary | ICD-10-CM

## 2020-04-29 DIAGNOSIS — Z20.822 SUSPECTED 2019 NOVEL CORONAVIRUS INFECTION: ICD-10-CM

## 2020-04-29 NOTE — PROGRESS NOTES
Rapid Covid Test - Not Detected - will send result to [facility] for resulting and employee follow up.

## 2020-05-26 ENCOUNTER — TELEPHONE (OUTPATIENT)
Dept: INTERNAL MEDICINE CLINIC | Facility: CLINIC | Age: 40
End: 2020-05-26

## 2020-05-26 NOTE — TELEPHONE ENCOUNTER
Attempted to call. Unable to get through. Dr Sabino Garcia: please review message. If you need to see her in office, looks like she works at [employer].

## 2020-05-26 NOTE — TELEPHONE ENCOUNTER
----- Message from Reena Anne sent at 5/26/2020  9:04 AM CDT -----  Regarding: Non-Urgent Medical Question  Contact: [phone]  Good Morning  Dr. Jacquelyn Maciel and Staff,     I would like to request an rx to treat likely BV.   It has been almost 20 years s

## 2020-05-28 ENCOUNTER — NURSE ONLY (OUTPATIENT)
Dept: ALLERGY | Facility: CLINIC | Age: 40
End: 2020-05-28
Payer: COMMERCIAL

## 2020-05-28 DIAGNOSIS — J30.89 ENVIRONMENTAL AND SEASONAL ALLERGIES: ICD-10-CM

## 2020-05-28 PROCEDURE — 95117 IMMUNOTHERAPY INJECTIONS: CPT | Performed by: ALLERGY & IMMUNOLOGY

## 2020-05-28 PROCEDURE — 95165 ANTIGEN THERAPY SERVICES: CPT | Performed by: ALLERGY & IMMUNOLOGY

## 2020-05-28 RX ORDER — METRONIDAZOLE 500 MG/1
500 TABLET ORAL 2 TIMES DAILY
Qty: 14 TABLET | Refills: 0 | Status: SHIPPED | OUTPATIENT
Start: 2020-05-28 | End: 2020-07-30 | Stop reason: ALTCHOICE

## 2020-05-28 NOTE — TELEPHONE ENCOUNTER
Called and spoke to patient  She has been having symptoms for over 1-1/2 weeks  She has been taking probiotics without any help  Medicine sent to her pharmacy on file  Encounter closed

## 2020-05-29 ENCOUNTER — NURSE TRIAGE (OUTPATIENT)
Dept: INTERNAL MEDICINE CLINIC | Facility: CLINIC | Age: 40
End: 2020-05-29

## 2020-05-29 NOTE — TELEPHONE ENCOUNTER
Action Requested: Summary for Provider     []  Critical Lab, Recommendations Needed  [] Need Additional Advice  []   FYI    []   Need Orders  [] Need Medications Sent to Pharmacy  []  Other     SUMMARY: Dr Jackson Vicente, patient request if you can please send ab

## 2020-05-30 RX ORDER — SULFAMETHOXAZOLE AND TRIMETHOPRIM 800; 160 MG/1; MG/1
1 TABLET ORAL 2 TIMES DAILY
Qty: 6 TABLET | Refills: 0 | Status: SHIPPED | OUTPATIENT
Start: 2020-05-30 | End: 2020-06-02

## 2020-06-29 ENCOUNTER — NURSE ONLY (OUTPATIENT)
Dept: ALLERGY | Facility: CLINIC | Age: 40
End: 2020-06-29
Payer: COMMERCIAL

## 2020-06-29 DIAGNOSIS — J30.89 ENVIRONMENTAL AND SEASONAL ALLERGIES: ICD-10-CM

## 2020-06-29 PROCEDURE — 95117 IMMUNOTHERAPY INJECTIONS: CPT | Performed by: ALLERGY & IMMUNOLOGY

## 2020-06-29 PROCEDURE — 95165 ANTIGEN THERAPY SERVICES: CPT | Performed by: ALLERGY & IMMUNOLOGY

## 2020-07-27 ENCOUNTER — NURSE ONLY (OUTPATIENT)
Dept: ALLERGY | Facility: CLINIC | Age: 40
End: 2020-07-27
Payer: COMMERCIAL

## 2020-07-27 DIAGNOSIS — J30.89 ENVIRONMENTAL AND SEASONAL ALLERGIES: ICD-10-CM

## 2020-07-27 PROCEDURE — 95117 IMMUNOTHERAPY INJECTIONS: CPT | Performed by: ALLERGY & IMMUNOLOGY

## 2020-07-30 ENCOUNTER — PATIENT MESSAGE (OUTPATIENT)
Dept: INTERNAL MEDICINE CLINIC | Facility: CLINIC | Age: 40
End: 2020-07-30

## 2020-07-30 ENCOUNTER — PATIENT MESSAGE (OUTPATIENT)
Dept: ALLERGY | Facility: CLINIC | Age: 40
End: 2020-07-30

## 2020-07-30 ENCOUNTER — TELEMEDICINE (OUTPATIENT)
Dept: INTERNAL MEDICINE CLINIC | Facility: CLINIC | Age: 40
End: 2020-07-30

## 2020-07-30 DIAGNOSIS — R53.82 CHRONIC FATIGUE: Primary | ICD-10-CM

## 2020-07-30 PROCEDURE — 99213 OFFICE O/P EST LOW 20 MIN: CPT | Performed by: INTERNAL MEDICINE

## 2020-07-30 RX ORDER — ALBUTEROL SULFATE 90 UG/1
AEROSOL, METERED RESPIRATORY (INHALATION)
Qty: 1 INHALER | Refills: 0 | Status: SHIPPED | OUTPATIENT
Start: 2020-07-30 | End: 2020-10-27

## 2020-07-30 RX ORDER — BUDESONIDE AND FORMOTEROL FUMARATE DIHYDRATE 160; 4.5 UG/1; UG/1
2 AEROSOL RESPIRATORY (INHALATION) 2 TIMES DAILY
Qty: 1 INHALER | Refills: 2 | Status: SHIPPED | OUTPATIENT
Start: 2020-07-30 | End: 2020-08-26

## 2020-07-30 RX ORDER — ARMODAFINIL 50 MG/1
50 TABLET ORAL DAILY
Qty: 30 TABLET | Refills: 0 | Status: SHIPPED | OUTPATIENT
Start: 2020-07-30 | End: 2020-10-01

## 2020-07-30 NOTE — PROGRESS NOTES
Patient ID: Ernie Perry is a 41year old female. No chief complaint on file. HISTORY OF PRESENT ILLNESS:   Patient presents for above. This visit is conducted using Telemedicine with live, interactive video and audio.   C/c fatigue   C/o fatigue INJECTION (IA)  1/7/16    RIGHT SUPRASCAPULAR NERVE BLOCK    • INTERCOSTAL NERVE BLOCK Right 1/7/2016    Performed by Ritika Amaya MD at [facility] MAIN OR   • KNEE SCOPE,MED+LAT MENIS REPAIR Left    • OTHER SURGICAL HISTORY Right 01/2019    meniscus repair o file        Non-medical: Not on file    Tobacco Use      Smoking status: Never Smoker      Smokeless tobacco: Never Used    Substance and Sexual Activity      Alcohol use:  Yes        Alcohol/week: 4.0 standard drinks        Types: 4 Standard drinks or equi METABOLIC PANEL (14); Future  - ASSAY, THYROID STIM HORMONE; Future  - VITAMIN D, 25-HYDROXY; Future  - VITAMIN B12; Future  - HEMOGLOBIN A1C; Future  - INSULIN; Future  - MAGNESIUM;  Future  - INTEGRATIVE MEDICINE PHYSICIAN CONSULTATION - INTERNAL REFERRAL documents. which are located on the [facility] website. The patient verbally agreed to telehealth consent form, related consents and the risks discussed. Lastly, the patient confirmed that they were in [place].    Included in this visit, time may have been sp

## 2020-07-30 NOTE — TELEPHONE ENCOUNTER
From: Mariluz Del Rio  To: Maggie Mi MD  Sent: 7/30/2020 8:20 AM CDT  Subject: Prescription Question    Good Morning,    I would like to please request a refill on Albuterol and as discussed in person, try increasing the Symbicort to the 160-4.5mcg d

## 2020-07-30 NOTE — TELEPHONE ENCOUNTER
Routed to Dr. Regina Leon for advise, thanks.       Future Appointments   Date Time Provider Airam Leiva   9/10/2020 10:00 AM Seema Perrin DO [facility]

## 2020-07-30 NOTE — TELEPHONE ENCOUNTER
From: Elisabet Anne  To: Bernie Lemons MD  Sent: 7/30/2020 12:09 PM CDT  Subject: Prescription Question    Good Afternoon,     I had a video visit with Dr. Addison Schilling this morning.  New rx was prescribed for Nuvigil (Armodafinil) and we were not certain how to

## 2020-07-31 ENCOUNTER — TELEPHONE (OUTPATIENT)
Dept: ALLERGY | Facility: CLINIC | Age: 40
End: 2020-07-31

## 2020-08-24 ENCOUNTER — TELEPHONE (OUTPATIENT)
Dept: INTERNAL MEDICINE CLINIC | Facility: HOSPITAL | Age: 40
End: 2020-08-24

## 2020-08-24 ENCOUNTER — LAB ENCOUNTER (OUTPATIENT)
Dept: LAB | Age: 40
End: 2020-08-24
Attending: PREVENTIVE MEDICINE
Payer: COMMERCIAL

## 2020-08-24 DIAGNOSIS — Z20.822 SUSPECTED COVID-19 VIRUS INFECTION: Primary | ICD-10-CM

## 2020-08-24 DIAGNOSIS — Z20.822 SUSPECTED COVID-19 VIRUS INFECTION: ICD-10-CM

## 2020-08-24 LAB — SARS-COV-2 RNA RESP QL NAA+PROBE: NOT DETECTED

## 2020-08-26 ENCOUNTER — NURSE ONLY (OUTPATIENT)
Dept: ALLERGY | Facility: CLINIC | Age: 40
End: 2020-08-26
Payer: COMMERCIAL

## 2020-08-26 DIAGNOSIS — J30.89 ENVIRONMENTAL AND SEASONAL ALLERGIES: ICD-10-CM

## 2020-08-26 PROCEDURE — 95117 IMMUNOTHERAPY INJECTIONS: CPT | Performed by: ALLERGY & IMMUNOLOGY

## 2020-08-26 RX ORDER — BUDESONIDE AND FORMOTEROL FUMARATE DIHYDRATE 160; 4.5 UG/1; UG/1
AEROSOL RESPIRATORY (INHALATION)
Qty: 3 INHALER | Refills: 0 | Status: SHIPPED | OUTPATIENT
Start: 2020-08-26 | End: 2020-12-10

## 2020-09-03 ENCOUNTER — MED REC SCAN ONLY (OUTPATIENT)
Dept: INTERNAL MEDICINE CLINIC | Facility: CLINIC | Age: 40
End: 2020-09-03

## 2020-09-14 ENCOUNTER — PATIENT MESSAGE (OUTPATIENT)
Dept: INTERNAL MEDICINE CLINIC | Facility: CLINIC | Age: 40
End: 2020-09-14

## 2020-09-14 DIAGNOSIS — R53.83 FATIGUE, UNSPECIFIED TYPE: Primary | ICD-10-CM

## 2020-09-15 NOTE — TELEPHONE ENCOUNTER
From: Nestor Anne  To: Shantel Kelly MD  Sent: 9/14/2020 5:53 PM CDT  Subject: Test Results Tena Dolan and Staff,    I saw my 8/27/20 results on the [employer] portal, I hadn't your thoughts on them.  The only thing marginal I wanted to chec

## 2020-09-23 ENCOUNTER — NURSE ONLY (OUTPATIENT)
Dept: ALLERGY | Facility: CLINIC | Age: 40
End: 2020-09-23
Payer: COMMERCIAL

## 2020-09-23 DIAGNOSIS — J30.89 ENVIRONMENTAL AND SEASONAL ALLERGIES: ICD-10-CM

## 2020-09-23 PROCEDURE — 95117 IMMUNOTHERAPY INJECTIONS: CPT | Performed by: ALLERGY & IMMUNOLOGY

## 2020-10-01 ENCOUNTER — OFFICE VISIT (OUTPATIENT)
Dept: INTEGRATIVE MEDICINE | Facility: CLINIC | Age: 40
End: 2020-10-01
Payer: COMMERCIAL

## 2020-10-01 VITALS
BODY MASS INDEX: 25.72 KG/M2 | WEIGHT: 131 LBS | HEART RATE: 75 BPM | DIASTOLIC BLOOD PRESSURE: 68 MMHG | HEIGHT: 60 IN | SYSTOLIC BLOOD PRESSURE: 122 MMHG | OXYGEN SATURATION: 98 %

## 2020-10-01 DIAGNOSIS — R53.82 CHRONIC FATIGUE: Primary | ICD-10-CM

## 2020-10-01 DIAGNOSIS — R19.5 LOOSE STOOLS: ICD-10-CM

## 2020-10-01 PROCEDURE — 3008F BODY MASS INDEX DOCD: CPT | Performed by: FAMILY MEDICINE

## 2020-10-01 PROCEDURE — 99214 OFFICE O/P EST MOD 30 MIN: CPT | Performed by: FAMILY MEDICINE

## 2020-10-01 PROCEDURE — 3078F DIAST BP <80 MM HG: CPT | Performed by: FAMILY MEDICINE

## 2020-10-01 PROCEDURE — 3074F SYST BP LT 130 MM HG: CPT | Performed by: FAMILY MEDICINE

## 2020-10-01 NOTE — PROGRESS NOTES
Deandre Ferrera is a 41year old female. Patient presents with:  New Patient: fatigue       HPI:     Has a long-standing h/o fatigue. Started at age 6yo. Never feels refreshed from sleep. Feels like a heaviness, a cloud over head. Fogginess.   Thyroid puffs by inhalation route every 4 - 6 hours as needed 1 Inhaler 0   • Cholecalciferol (VITAMIN D3 OR) Take by mouth. • Cyanocobalamin (VITAMIN B-12 OR) Take by mouth. • Fluticasone Propionate (FLONASE NA) by Nasal route.          SOCIAL HISTORY:   S Sleep Concern: Not Asked        Stress Concern: Not Asked        Weight Concern: Not Asked        Special Diet: Not Asked        Back Care: Not Asked        Exercise: Yes          3X WEEKLY        Bike Helmet: Not Asked        Seat Belt: Yes        Self-Ex IGG,IGM,IGA  - EBVCA(IGG/M); Future  - VITAMIN B6; Future    2. Loose stools  - CANDIDA IGG,IGM,IGA    Evaluate for micronutrient deficiency and marker for dysbiosis. Pregnenolone level ordered to evaluate adrenal function.   Evaluate DHEA sulfate as a ma

## 2020-10-13 ENCOUNTER — PATIENT MESSAGE (OUTPATIENT)
Dept: INTERNAL MEDICINE CLINIC | Facility: CLINIC | Age: 40
End: 2020-10-13

## 2020-10-14 RX ORDER — ZOLPIDEM TARTRATE 10 MG/1
10 TABLET ORAL NIGHTLY PRN
Qty: 30 TABLET | Refills: 1 | Status: SHIPPED | OUTPATIENT
Start: 2020-10-14 | End: 2020-12-10

## 2020-10-14 NOTE — TELEPHONE ENCOUNTER
From: Hammad Anne  To:  Padmini Roper MD  Sent: 10/13/2020 7:54 PM CDT  Subject: Prescription Aneita Corneliosrinivasan Vicente and Crew,    I would like to request a refill if the Zolpidem, but ask to increase to the 10mg strength, as it seems to work better

## 2020-10-14 NOTE — TELEPHONE ENCOUNTER
Routed to Dr Ramona Larson for advise, thanks. Requested Prescriptions     Pending Prescriptions Disp Refills   • Zolpidem Tartrate 10 MG Oral Tab 30 tablet 1     Sig: Take 1 tablet (10 mg total) by mouth nightly as needed for Sleep.        Last Office [employer]

## 2020-10-16 ENCOUNTER — TELEPHONE (OUTPATIENT)
Dept: INTERNAL MEDICINE CLINIC | Facility: HOSPITAL | Age: 40
End: 2020-10-16

## 2020-10-16 DIAGNOSIS — Z20.822 SUSPECTED COVID-19 VIRUS INFECTION: Primary | ICD-10-CM

## 2020-10-17 ENCOUNTER — LAB ENCOUNTER (OUTPATIENT)
Dept: LAB | Age: 40
End: 2020-10-17
Attending: PREVENTIVE MEDICINE
Payer: COMMERCIAL

## 2020-10-17 DIAGNOSIS — Z20.822 SUSPECTED COVID-19 VIRUS INFECTION: ICD-10-CM

## 2020-10-20 ENCOUNTER — NURSE ONLY (OUTPATIENT)
Dept: ALLERGY | Facility: CLINIC | Age: 40
End: 2020-10-20
Payer: COMMERCIAL

## 2020-10-20 DIAGNOSIS — J30.89 ENVIRONMENTAL AND SEASONAL ALLERGIES: ICD-10-CM

## 2020-10-20 PROCEDURE — 95117 IMMUNOTHERAPY INJECTIONS: CPT | Performed by: ALLERGY & IMMUNOLOGY

## 2020-11-16 RX ORDER — ALBUTEROL SULFATE 90 UG/1
AEROSOL, METERED RESPIRATORY (INHALATION)
Qty: 8.5 INHALER | Refills: 0 | Status: SHIPPED | OUTPATIENT
Start: 2020-11-16 | End: 2020-12-10

## 2020-11-17 RX ORDER — KETOCONAZOLE 20 MG/G
CREAM TOPICAL
Qty: 60 G | Refills: 0 | Status: SHIPPED | OUTPATIENT
Start: 2020-11-17 | End: 2020-12-10

## 2020-11-17 NOTE — TELEPHONE ENCOUNTER
Received refill request for Ketoconazole cream- apply 2 times a day to affected areas. LOV 4-9-20. Refill pended and forwarded to Dr. Barbara Núñez for further directions.

## 2020-11-17 NOTE — TELEPHONE ENCOUNTER
PATIENT REQUEST FOR REFILL OR NEW RX    KETOCONAZOLE 2% CREAM  QTY 60.0 GM SIXTY    DATE LAST FILLED 02/04/2020

## 2020-11-23 ENCOUNTER — NURSE ONLY (OUTPATIENT)
Dept: ALLERGY | Facility: CLINIC | Age: 40
End: 2020-11-23
Payer: COMMERCIAL

## 2020-11-23 DIAGNOSIS — J30.89 ENVIRONMENTAL AND SEASONAL ALLERGIES: ICD-10-CM

## 2020-11-23 PROCEDURE — 95165 ANTIGEN THERAPY SERVICES: CPT | Performed by: ALLERGY & IMMUNOLOGY

## 2020-11-23 PROCEDURE — 95117 IMMUNOTHERAPY INJECTIONS: CPT | Performed by: ALLERGY & IMMUNOLOGY

## 2020-12-10 ENCOUNTER — OFFICE VISIT (OUTPATIENT)
Dept: INTEGRATIVE MEDICINE | Facility: CLINIC | Age: 40
End: 2020-12-10
Payer: COMMERCIAL

## 2020-12-10 VITALS
OXYGEN SATURATION: 97 % | BODY MASS INDEX: 25.95 KG/M2 | HEART RATE: 80 BPM | DIASTOLIC BLOOD PRESSURE: 62 MMHG | SYSTOLIC BLOOD PRESSURE: 112 MMHG | HEIGHT: 60 IN | WEIGHT: 132.19 LBS

## 2020-12-10 DIAGNOSIS — B37.9 CANDIDIASIS: ICD-10-CM

## 2020-12-10 DIAGNOSIS — R19.5 LOOSE STOOLS: ICD-10-CM

## 2020-12-10 DIAGNOSIS — R53.82 CHRONIC FATIGUE: Primary | ICD-10-CM

## 2020-12-10 PROBLEM — G47.9 SLEEP DISORDER: Status: ACTIVE | Noted: 2020-12-10

## 2020-12-10 PROBLEM — E27.9 DISORDER OF ADRENAL GLAND (HCC): Status: ACTIVE | Noted: 2020-12-10

## 2020-12-10 PROBLEM — N92.6 IRREGULAR PERIODS: Status: ACTIVE | Noted: 2020-12-10

## 2020-12-10 PROBLEM — M50.20 PROLAPSED CERVICAL INTERVERTEBRAL DISC: Status: ACTIVE | Noted: 2020-12-10

## 2020-12-10 PROBLEM — B37.7: Status: ACTIVE | Noted: 2020-12-10

## 2020-12-10 PROBLEM — Z72.820 SLEEP DEPRIVATION: Status: ACTIVE | Noted: 2020-12-10

## 2020-12-10 PROCEDURE — 99214 OFFICE O/P EST MOD 30 MIN: CPT | Performed by: FAMILY MEDICINE

## 2020-12-10 PROCEDURE — 3078F DIAST BP <80 MM HG: CPT | Performed by: FAMILY MEDICINE

## 2020-12-10 PROCEDURE — 3074F SYST BP LT 130 MM HG: CPT | Performed by: FAMILY MEDICINE

## 2020-12-10 PROCEDURE — 3008F BODY MASS INDEX DOCD: CPT | Performed by: FAMILY MEDICINE

## 2020-12-10 RX ORDER — FLUTICASONE PROPIONATE 50 MCG
2 SPRAY, SUSPENSION (ML) NASAL DAILY
COMMUNITY

## 2020-12-10 RX ORDER — LIOTHYRONINE SODIUM 5 UG/1
TABLET ORAL
COMMUNITY
End: 2020-12-10

## 2020-12-10 RX ORDER — FLUCONAZOLE 150 MG/1
TABLET ORAL
COMMUNITY
End: 2020-12-10

## 2020-12-10 NOTE — PROGRESS NOTES
Jose Guerrero is a 41year old female. Patient presents with: Integrative Medicine Consult: 2 mo f/u      HPI:     Started most of the other supplements except the Candida Plus.    Working on diet, definitely following Mon-Fri  Still with heavy foggy feel Food insecurity        Worry: Not on file        Inability: Not on file      Transportation needs        Medical: Not on file        Non-medical: Not on file    Tobacco Use      Smoking status: Never Smoker      Smokeless tobacco: Never Used    Substan RIGHT SUPRASCAPULAR NERVE BLOCK    • Knee scope,med+lat menis repair Left    • Other surgical history Right 01/2019    meniscus repair of right knee   • Other surgical history Left 2012    Meniscus repair of the left knee   • Other surgical history  11/20 The products and items listed below (the “Products”)  and their claims have not been evaluated by the Food and Drug Administration. Dietary products are not intended to treat, prevent, mitigate or cure disease.  Ultimately, you must draw your own conclusion [facility], [address] [place] [address] Integrative Medicine  Phone: [phone]. [address] [facility] [place]  [address] [facility]  [email or web address]    [facility]  [id number] [place] [address].[facility] 351, [address]

## 2020-12-10 NOTE — PATIENT INSTRUCTIONS
I have complete yaron in the body's ability to heal and transform. The products and items listed below (the “Products”)  and their claims have not been evaluated by the Food and Drug Administration.  Dietary products are not intended to treat, prevent, [email or web address]  PHONE:[phone]    Areli  Office: 546 [facility] 27, [address] Integrative Medicine  Phone: [phone]. [address] [facility] [place]  [address] [facility]  [email or web address]

## 2020-12-14 ENCOUNTER — OFFICE VISIT (OUTPATIENT)
Dept: PAIN CLINIC | Facility: CLINIC | Age: 40
End: 2020-12-14
Payer: COMMERCIAL

## 2020-12-14 VITALS
HEIGHT: 60 IN | RESPIRATION RATE: 16 BRPM | WEIGHT: 134 LBS | HEART RATE: 68 BPM | DIASTOLIC BLOOD PRESSURE: 64 MMHG | BODY MASS INDEX: 26.31 KG/M2 | SYSTOLIC BLOOD PRESSURE: 118 MMHG

## 2020-12-14 DIAGNOSIS — M54.2 CERVICALGIA: ICD-10-CM

## 2020-12-14 DIAGNOSIS — M79.18 CERVICAL MYOFASCIAL PAIN SYNDROME: Primary | ICD-10-CM

## 2020-12-14 PROCEDURE — 99214 OFFICE O/P EST MOD 30 MIN: CPT | Performed by: ANESTHESIOLOGY

## 2020-12-14 PROCEDURE — 3008F BODY MASS INDEX DOCD: CPT | Performed by: ANESTHESIOLOGY

## 2020-12-14 PROCEDURE — 3078F DIAST BP <80 MM HG: CPT | Performed by: ANESTHESIOLOGY

## 2020-12-14 PROCEDURE — 3074F SYST BP LT 130 MM HG: CPT | Performed by: ANESTHESIOLOGY

## 2020-12-14 RX ORDER — PSEUDOEPHEDRINE HCL 120 MG/1
120 TABLET, FILM COATED, EXTENDED RELEASE ORAL EVERY 12 HOURS
COMMUNITY
End: 2021-03-12

## 2020-12-14 RX ORDER — HYDROCODONE BITARTRATE AND ACETAMINOPHEN 5; 325 MG/1; MG/1
1 TABLET ORAL
Qty: 21 TABLET | Refills: 0 | Status: SHIPPED | OUTPATIENT
Start: 2020-12-14 | End: 2020-12-22

## 2020-12-14 NOTE — PROGRESS NOTES
Patient presents in office today with reported pain in neck and left trapezius    Current pain level reported = 7/10

## 2020-12-14 NOTE — PROGRESS NOTES
Name: Carlos Reeder   : 1980   DOS: 2020     Pain Clinic Follow Up Visit:   Patient presents with:   Follow - Up      Carlos Reeder is a 41year old female with a longstanding history of neck pain following up to discuss further management opti diagnosis)  Cervicalgia    The patient is a pleasant 49-year-old female with history of neck pain. This is likely combination of cervical degenerative disc disease, and myofascial pain. Discussed treatment options including trigger point injections.   The

## 2020-12-16 ENCOUNTER — TELEPHONE (OUTPATIENT)
Dept: NEUROLOGY | Facility: CLINIC | Age: 40
End: 2020-12-16

## 2020-12-16 NOTE — TELEPHONE ENCOUNTER
Medical clearance requested (will send to Pain Navigator) -NO       Prior authorization request completed for: Left Cervical TPI   Authorization #No Prior Authorization is Required for Pain Management Injections. -Based on Medical Necessity.    Spoke with Natalia Figueroa

## 2020-12-17 NOTE — TELEPHONE ENCOUNTER
Patient advised of insurance approval to proceed with injections and is agreeable to scheduling. Patient scheduled for procedure, pre-procedure instructions reviewed. Patient not required to hold any medications prior to inj.  Patient verbalized understandi Ibuprofen (Motrin, Advil, Vicoprofen), Naproxen (Naprosyn, Aleve), Piroxcam (Feldene), Meloxicam (Mobic)--(Cervical procedures will require 3 days), Oxaprozin (Daypro), Diclofenac (Voltaren), Indomethacin (Indocin), Etodolac (Lodine), Nabumetone (Relafen

## 2020-12-18 ENCOUNTER — OFFICE VISIT (OUTPATIENT)
Dept: PAIN CLINIC | Facility: CLINIC | Age: 40
End: 2020-12-18
Payer: COMMERCIAL

## 2020-12-18 ENCOUNTER — TELEPHONE (OUTPATIENT)
Dept: PAIN CLINIC | Facility: CLINIC | Age: 40
End: 2020-12-18

## 2020-12-18 VITALS
SYSTOLIC BLOOD PRESSURE: 116 MMHG | RESPIRATION RATE: 16 BRPM | WEIGHT: 134 LBS | DIASTOLIC BLOOD PRESSURE: 76 MMHG | HEART RATE: 86 BPM | OXYGEN SATURATION: 96 % | HEIGHT: 60 IN | BODY MASS INDEX: 26.31 KG/M2

## 2020-12-18 DIAGNOSIS — M54.2 CERVICALGIA: Primary | ICD-10-CM

## 2020-12-18 DIAGNOSIS — M79.18 CERVICAL MYOFASCIAL PAIN SYNDROME: ICD-10-CM

## 2020-12-18 PROCEDURE — 3074F SYST BP LT 130 MM HG: CPT | Performed by: ANESTHESIOLOGY

## 2020-12-18 PROCEDURE — 3078F DIAST BP <80 MM HG: CPT | Performed by: ANESTHESIOLOGY

## 2020-12-18 PROCEDURE — 20553 NJX 1/MLT TRIGGER POINTS 3/>: CPT | Performed by: ANESTHESIOLOGY

## 2020-12-18 PROCEDURE — 3008F BODY MASS INDEX DOCD: CPT | Performed by: ANESTHESIOLOGY

## 2020-12-18 PROCEDURE — S0020 INJECTION, BUPIVICAINE HYDRO: HCPCS | Performed by: ANESTHESIOLOGY

## 2020-12-18 RX ORDER — BUPIVACAINE HYDROCHLORIDE 5 MG/ML
9 INJECTION, SOLUTION EPIDURAL; INTRACAUDAL ONCE
Status: COMPLETED | OUTPATIENT
Start: 2020-12-18 | End: 2020-12-18

## 2020-12-18 RX ORDER — METHYLPREDNISOLONE ACETATE 40 MG/ML
40 INJECTION, SUSPENSION INTRA-ARTICULAR; INTRALESIONAL; INTRAMUSCULAR; SOFT TISSUE ONCE
Status: COMPLETED | OUTPATIENT
Start: 2020-12-18 | End: 2020-12-18

## 2020-12-18 NOTE — PROGRESS NOTES
Patient presents in office today with reported pain in left neck and left shoulder    Current pain level reported = 5/10    Last reported dose of HYDROcodone-acetaminophen 5-325 MG Oral Tab, patient stated 2 days ago prior to OV

## 2020-12-18 NOTE — PROCEDURES
Date of procedure: 12/18/2020    Preop diagnosis: Left cervical myofascial pain    Postop diagnosis: Same    Procedure: Left cervical paravertebral trigger point injections    Surgeon: Alivia London    Anesthesia: None    EBL: Zero    Indication: The patient

## 2020-12-18 NOTE — TELEPHONE ENCOUNTER
Patient in office for TPI inj . Stated she only received 7 days supply for Norco and was informed by the pharmacy that PA may be required since she is considered opioid naive. Referral placed.

## 2020-12-18 NOTE — PROGRESS NOTES
Timeout completed prior to procedure @ 3830. Participants present for timeout:  Dr. Katie Lino, and patient.

## 2020-12-20 ENCOUNTER — IMMUNIZATION (OUTPATIENT)
Dept: LAB | Facility: HOSPITAL | Age: 40
End: 2020-12-20
Attending: PREVENTIVE MEDICINE
Payer: COMMERCIAL

## 2020-12-20 DIAGNOSIS — Z23 NEED FOR VACCINATION: ICD-10-CM

## 2020-12-20 PROCEDURE — 0001A PFIZER-BIONTECH COVID-19 VACCINE: CPT

## 2020-12-21 NOTE — TELEPHONE ENCOUNTER
Prior authorization request completed for: HYDROcodone-acetaminophen 5-325 MG Oral Tab  Authorization #Drug is covered by current benefit plan.  No further PA activity needed  Physician: Natty Busch

## 2020-12-22 RX ORDER — HYDROCODONE BITARTRATE AND ACETAMINOPHEN 5; 325 MG/1; MG/1
1 TABLET ORAL
Qty: 14 TABLET | Refills: 0 | Status: SHIPPED | OUTPATIENT
Start: 2020-12-22 | End: 2021-01-06

## 2020-12-22 NOTE — TELEPHONE ENCOUNTER
Contacted pharmacy and spoke to Jos. Pt picked up 7 day supply on 12/14 and will need a new script for any remaining.

## 2020-12-29 ENCOUNTER — NURSE ONLY (OUTPATIENT)
Dept: ALLERGY | Facility: CLINIC | Age: 40
End: 2020-12-29
Payer: COMMERCIAL

## 2020-12-29 DIAGNOSIS — J30.89 ENVIRONMENTAL AND SEASONAL ALLERGIES: ICD-10-CM

## 2020-12-29 PROCEDURE — 95117 IMMUNOTHERAPY INJECTIONS: CPT | Performed by: ALLERGY & IMMUNOLOGY

## 2020-12-30 NOTE — PATIENT INSTRUCTIONS
Pt requesting an early bill of the next 10 units. Pt will have a total of 16 remaining units then after today's injections.

## 2021-01-06 ENCOUNTER — TELEPHONE (OUTPATIENT)
Dept: PAIN CLINIC | Facility: CLINIC | Age: 41
End: 2021-01-06

## 2021-01-06 DIAGNOSIS — M54.2 CERVICALGIA: Primary | ICD-10-CM

## 2021-01-06 RX ORDER — HYDROCODONE BITARTRATE AND ACETAMINOPHEN 5; 325 MG/1; MG/1
1 TABLET ORAL
Qty: 14 TABLET | Refills: 0 | Status: SHIPPED | OUTPATIENT
Start: 2021-01-06 | End: 2021-01-20

## 2021-01-06 NOTE — TELEPHONE ENCOUNTER
Spoke to Cheryl Gilmore to provide Evelio Maharaj. Cheryl Gilmore re-processed and script is covered by ins.

## 2021-01-06 NOTE — TELEPHONE ENCOUNTER
Resubmitted PA for HYDROcodone-acetaminophen 5-325 MG Oral Tab via [facility]     Authorized     Key: [id number]   PA Case ID: [id number]  Prior Auth Coverage Start Date:12/07/2020  Coverage End Date:01/06/2022;

## 2021-01-06 NOTE — TELEPHONE ENCOUNTER
Medication: HYDROcodone-acetaminophen 5-325 MG Oral Tab    Date of last refill: 12/22/20  [place] 28/34/1414 Verified by MF    Last office visit: 12/14/2020  Due back to clinic per last office note:  Not indicated  Date

## 2021-01-07 NOTE — TELEPHONE ENCOUNTER
Spoke with patient and informed new PA was completed and pharmacy was notified of authorization.      Per referral 12/18/20 -Resubmitted PA for HYDROcodone-acetaminophen 5-325 MG Oral Tab via [facility] [facility]      Authorized      Key: [id number]   PA Case

## 2021-01-10 ENCOUNTER — IMMUNIZATION (OUTPATIENT)
Dept: LAB | Facility: HOSPITAL | Age: 41
End: 2021-01-10
Attending: PREVENTIVE MEDICINE
Payer: COMMERCIAL

## 2021-01-10 DIAGNOSIS — Z23 NEED FOR VACCINATION: ICD-10-CM

## 2021-01-10 PROCEDURE — 0002A SARSCOV2 VAC 30MCG/0.3ML IM: CPT

## 2021-01-20 DIAGNOSIS — M54.2 CERVICALGIA: ICD-10-CM

## 2021-01-20 RX ORDER — HYDROCODONE BITARTRATE AND ACETAMINOPHEN 5; 325 MG/1; MG/1
1 TABLET ORAL
Qty: 14 TABLET | Refills: 0 | Status: SHIPPED | OUTPATIENT
Start: 2021-01-20 | End: 2021-02-03

## 2021-01-20 NOTE — TELEPHONE ENCOUNTER
Medication: HYDROcodone-acetaminophen 5-325 MG Oral Tab    Date of last refill: 1/6/2021 (14 day supply)  [place] 5/0/6729 Verified by MF    Last office visit: 12/14/2020  Due back to clinic per last office note:  Not in

## 2021-02-01 ENCOUNTER — NURSE ONLY (OUTPATIENT)
Dept: ALLERGY | Facility: CLINIC | Age: 41
End: 2021-02-01
Payer: COMMERCIAL

## 2021-02-01 DIAGNOSIS — J30.89 ENVIRONMENTAL AND SEASONAL ALLERGIES: ICD-10-CM

## 2021-02-01 PROCEDURE — 95117 IMMUNOTHERAPY INJECTIONS: CPT | Performed by: ALLERGY & IMMUNOLOGY

## 2021-02-03 DIAGNOSIS — M54.2 CERVICALGIA: ICD-10-CM

## 2021-02-03 RX ORDER — HYDROCODONE BITARTRATE AND ACETAMINOPHEN 5; 325 MG/1; MG/1
1 TABLET ORAL
Qty: 14 TABLET | Refills: 0 | Status: SHIPPED | OUTPATIENT
Start: 2021-02-03 | End: 2021-02-18

## 2021-02-03 NOTE — TELEPHONE ENCOUNTER
Medication: HYDROcodone-acetaminophen 5-325 MG Oral Tab    Date of last refill: 01/20/2021  [place] 74/87/5719  Generic Name:  HYDROCODONE-ACETAMINOPHEN  Date Filled:  1/21/2021  Drug Strength:  5MG-325MG  Drug Form:  TA

## 2021-02-18 DIAGNOSIS — M54.2 CERVICALGIA: ICD-10-CM

## 2021-02-18 RX ORDER — HYDROCODONE BITARTRATE AND ACETAMINOPHEN 5; 325 MG/1; MG/1
1 TABLET ORAL
Qty: 14 TABLET | Refills: 0 | Status: SHIPPED | OUTPATIENT
Start: 2021-02-18 | End: 2021-03-05

## 2021-02-18 NOTE — TELEPHONE ENCOUNTER
Medication: HYDROcodone-acetaminophen 5-325 MG Oral Tab    Date of last refill: 02/03/2021  [place] 84/63/3140  Generic Name:  HYDROCODONE-ACETAMINOPHEN  Date Filled:  2/3/2021  Drug Strength:  5MG-325MG  Drug Form:  TAB

## 2021-03-02 ENCOUNTER — NURSE ONLY (OUTPATIENT)
Dept: ALLERGY | Facility: CLINIC | Age: 41
End: 2021-03-02
Payer: COMMERCIAL

## 2021-03-02 DIAGNOSIS — J30.89 ENVIRONMENTAL AND SEASONAL ALLERGIES: ICD-10-CM

## 2021-03-02 PROCEDURE — 95117 IMMUNOTHERAPY INJECTIONS: CPT | Performed by: ALLERGY & IMMUNOLOGY

## 2021-03-04 ENCOUNTER — TELEPHONE (OUTPATIENT)
Dept: PAIN CLINIC | Facility: CLINIC | Age: 41
End: 2021-03-04

## 2021-03-04 DIAGNOSIS — M54.2 CERVICALGIA: ICD-10-CM

## 2021-03-04 RX ORDER — HYDROCODONE BITARTRATE AND ACETAMINOPHEN 5; 325 MG/1; MG/1
1 TABLET ORAL
Qty: 14 TABLET | Refills: 0 | Status: CANCELLED | OUTPATIENT
Start: 2021-03-04

## 2021-03-04 NOTE — TELEPHONE ENCOUNTER
Medication: HYDROcodone-acetaminophen 5-325 MG Oral Tab    Date of last refill: 2/18/2021  [place] 9/33/8159    Last office visit: 2/18/2020 for Left cervical paravertebral trigger point injections  Due back to clinic pe English

## 2021-03-04 NOTE — TELEPHONE ENCOUNTER
Noted patient has been refilling Rx every 15 days.      Dr Ismael Betancourt to advise on 30 day supply and Office visit for Narcotic Contract and UDS

## 2021-03-05 RX ORDER — HYDROCODONE BITARTRATE AND ACETAMINOPHEN 5; 325 MG/1; MG/1
1 TABLET ORAL
Qty: 30 TABLET | Refills: 0 | Status: SHIPPED | OUTPATIENT
Start: 2021-03-05 | End: 2021-03-30

## 2021-03-05 NOTE — TELEPHONE ENCOUNTER
Sarita Galo MD  You 16 minutes ago (8:43 AM)     I can increase to 30 day supply.  She can come and sign a contract etc    Message text          Left a detailed message on patient's confidential voicemail requesting to call back to schedule an OV for m

## 2021-03-12 ENCOUNTER — MED REC SCAN ONLY (OUTPATIENT)
Dept: PAIN CLINIC | Facility: CLINIC | Age: 41
End: 2021-03-12

## 2021-03-12 ENCOUNTER — LAB ENCOUNTER (OUTPATIENT)
Dept: LAB | Age: 41
End: 2021-03-12
Attending: ANESTHESIOLOGY
Payer: COMMERCIAL

## 2021-03-12 DIAGNOSIS — F11.90 CHRONIC NARCOTIC USE: ICD-10-CM

## 2021-03-12 PROCEDURE — 80307 DRUG TEST PRSMV CHEM ANLYZR: CPT | Performed by: ANESTHESIOLOGY

## 2021-03-12 NOTE — PROGRESS NOTES
Patient presents in office today with reported pain in neck    Current pain level reported = 4/10    Last reported dose of Norco, Wednesday

## 2021-03-12 NOTE — PROGRESS NOTES
Pt here in office for a visit. Reviewed pain agreement with pt. Pt verbalized understanding and signed. Copy provided. Pt sent to lab for urine specimen. Document sent to scanning. Snapshot and FYI updated.

## 2021-03-12 NOTE — PROGRESS NOTES
Name: Ilan Hammer   : 1980   DOS: 3/12/2021     Pain Clinic Follow Up Visit:   Patient presents with:   Follow - Up: medications review and narcotic contract renewal.      Ilan Hammer is a 41year old female with longstanding history neck pain pr non-antalgic gait.   Gait is normal.  Neck: Full range of motion noted  Skin: Warm, dry, intact  Respiratory: Nonlabored  Back: Gait is intact    IMAGES:     Cervical MRI reviewed personally with evidence of multilevel degeneration mostly in the lower cervi

## 2021-03-17 LAB
6-ACETYLMORHINE CUTOFF 20 NG/ML: NOT DETECTED
7-AMINOCLONAZEPAM 40 NG/ML: NOT DETECTED
A-OH-ALPRAZOLM CUTOFF 20 NG/ML: NOT DETECTED
ALPHA-OH-MIDAZOLAM (CUTOFF 20 NG/ML): NOT DETECTED
ALPRAZOLAM CUTOFF 40 NG/ML: NOT DETECTED
AMPHETAMINE CUTOFF 10 NG/ML: NOT DETECTED
BARBITURATES CUTOFF 200 NG/ML: NOT DETECTED
BENZOYLECGONINE  150 NG/ML: NOT DETECTED
BUPRENORPHINE CUTOFF 5 NG/ML: NOT DETECTED
CARISOPRODOL CUTOFF 100 NG/ML: NOT DETECTED
CODINE CUTOFF 40 NG/ML: NOT DETECTED
COLNAZEPAM CUTOFF 20 NG/ML: NOT DETECTED
CREATININE,URINE: 38.4 MG/DL
DIAZEPAM CUTOFF 50 NG/ML: NOT DETECTED
ETHYL-GLUCURONIDE 500 NG/ML: PRESENT
FENTANYL CUTOFF 2 NG/ML: NOT DETECTED
GABAPENTIN (CUTOFF 100 NG/ML): NOT DETECTED
HYDROCODONE CUTOFF 40 NG/ML: NOT DETECTED
HYDROMORPHONE CUTOFF 40 NG/ML: NOT DETECTED
LORAZEPAM CUTOFF 60 NG/ML: NOT DETECTED
MARIJUANA CUTOFF 20 NG/ML: NOT DETECTED
MDA CUTOFF 200 NG/ML: NOT DETECTED
MDEA EVE CUTOFF 200 NG/ML: NOT DETECTED
MDMA-ECSTASY CUTOFF 200 NG/ML: NOT DETECTED
MEPERIDINE MET CUTOFF 50 NG/ML: NOT DETECTED
METHADONE CUTOFF 150 NG/ML: NOT DETECTED
METHAMPHETMN CUTOFF 400 NG/ML: NOT DETECTED
METHYLPHENIDATE (CUTOFF 100 NG/ML): NOT DETECTED
MIDAZOLAM CUTOFF 20 NG/ML: NOT DETECTED
MORHINE CUTOFF 20 NG/ML: NOT DETECTED
NALOXONE (CUTOFF 100 NG/ML): NOT DETECTED
NORBUPRENORPHINE  20 NG/ML: NOT DETECTED
NORDIAZEPAM CUTOFF 50 NG/ML: NOT DETECTED
NORFENTANYL CUTOFF 2 NG/ML: NOT DETECTED
NORHYDRCODONE CUTOFF 100 NG/ML: NOT DETECTED
NOROXYCODONE CUTOFF 100 NG/ML: NOT DETECTED
NOROXYMORPHNE CUTOFF 100 NG/ML: NOT DETECTED
OXAZEPAM CUTOFF 50 NG/ML: NOT DETECTED
OXYCODONE CUTOFF 40 NG/ML: NOT DETECTED
OXYMORPHONE CUTOFF 40 NG/ML: NOT DETECTED
PCP CUTOFF 25 NG/ML: NOT DETECTED
PHENTERMINE CUTOFF 100 NG/ML: NOT DETECTED
PREGABALIN (CUTOFF 100 NG/ML): NOT DETECTED
TAPENTADOL CUTOFF 100 NG/ML: NOT DETECTED
TAPENTADOL-O SULF 200 NG/ML: NOT DETECTED
TEMAZEPAM CUTOFF 50 NG/ML: NOT DETECTED
TRAMADOL CUTOFF 200 NG/ML: NOT DETECTED
ZOLPIDEM CUTOFF 20 NG/ML: NOT DETECTED
ZOLPIDEM METABOLITE (CUTOFF 100 NG/ML): NOT DETECTED

## 2021-03-30 ENCOUNTER — PATIENT MESSAGE (OUTPATIENT)
Dept: PAIN CLINIC | Facility: CLINIC | Age: 41
End: 2021-03-30

## 2021-03-30 DIAGNOSIS — M54.2 CERVICALGIA: ICD-10-CM

## 2021-03-30 RX ORDER — CYCLOBENZAPRINE HCL 10 MG
10 TABLET ORAL 3 TIMES DAILY PRN
Qty: 45 TABLET | Refills: 0 | Status: SHIPPED | OUTPATIENT
Start: 2021-03-30

## 2021-03-30 RX ORDER — HYDROCODONE BITARTRATE AND ACETAMINOPHEN 5; 325 MG/1; MG/1
1 TABLET ORAL
Qty: 30 TABLET | Refills: 0 | Status: CANCELLED | OUTPATIENT
Start: 2021-03-30

## 2021-03-30 RX ORDER — HYDROCODONE BITARTRATE AND ACETAMINOPHEN 5; 325 MG/1; MG/1
1 TABLET ORAL
Qty: 30 TABLET | Refills: 0 | Status: SHIPPED | OUTPATIENT
Start: 2021-03-30

## 2021-03-30 NOTE — TELEPHONE ENCOUNTER
Medication: HYDROcodone-acetaminophen 5-325 MG Oral Tab     Medication already refilled:  HYDROcodone-acetaminophen 5-325 MG Oral Tab 30 tablet 0 3/30/2021     Sig - Route:  Take 1 tablet by mouth daily as needed for Pain. - Oral    Sent to pharmacy as: HYD

## 2021-03-30 NOTE — TELEPHONE ENCOUNTER
From: Liberty Hatchet  To: Zhang Dumont MD  Sent: 3/30/2021 9:55 AM CDT  Subject: Prescription Question    Good Morning Dr. Luz Blinks and Staff! So the time has come.  We are supposed to close on our house next week and move to [facility] at the end of nex

## 2021-04-21 ENCOUNTER — PATIENT MESSAGE (OUTPATIENT)
Dept: INTERNAL MEDICINE CLINIC | Facility: CLINIC | Age: 41
End: 2021-04-21

## 2021-04-21 RX ORDER — ZOLPIDEM TARTRATE 10 MG/1
10 TABLET ORAL NIGHTLY PRN
Qty: 30 TABLET | Refills: 1 | Status: SHIPPED | OUTPATIENT
Start: 2021-04-21 | End: 2021-10-04

## 2021-04-21 NOTE — TELEPHONE ENCOUNTER
Routed to Dr Sharan Carlos  for advise, thanks. [facility] pharm updated. Requested Prescriptions     Pending Prescriptions Disp Refills   • Zolpidem Tartrate 10 MG Oral Tab 30 tablet 1     Sig: Take 1 tablet (10 mg total) by mouth nightly as needed for Sleep.

## 2021-04-21 NOTE — TELEPHONE ENCOUNTER
From: Wali Anne  To: Mayte Richmond MD  Sent: 4/21/2021 3:30 AM CDT  Subject: Prescription Question    Good morning, Dr. Marina Mann and Staff,    Greetings! I wanted to check in with you to request a refill of the Ambien 10mg.  We just moved to [place] [id number]

## 2021-04-21 NOTE — TELEPHONE ENCOUNTER
Refilled so she has some until she can establish care there  but please ask her to use this sparingly

## 2021-05-25 ENCOUNTER — PATIENT MESSAGE (OUTPATIENT)
Dept: PAIN CLINIC | Facility: CLINIC | Age: 41
End: 2021-05-25

## 2021-05-25 NOTE — TELEPHONE ENCOUNTER
From: Carlos Reeder  To: Malcolm Villalpando MD  Sent: 5/25/2021 6:25 AM CDT  Subject: Other    Good Morning,     I wanted to ask a favor. I am planning to try to transfer care to Dr. Gricelda Franco here in [place].  I was wondering if I may have my last

## 2021-06-06 NOTE — TELEPHONE ENCOUNTER
Patient reports the albuterol HFA was sent in error by pharmacy. She does not need an inhaler.  Dr. Jorge Delgadillo please deny request
Refill requested for   Name from pharmacy: ALBUTEROL HFA (Areli Avilez) INHALER          Will file in chart as: ALBUTEROL SULFATE  (90 Base) MCG/ACT Inhalation Aero Soln    The original prescription was discontinued on 10/27/2020 by Deana Echevarria RN for
rx sent
patient

## 2021-09-22 ENCOUNTER — PATIENT MESSAGE (OUTPATIENT)
Dept: ALLERGY | Facility: CLINIC | Age: 41
End: 2021-09-22

## 2021-09-22 NOTE — TELEPHONE ENCOUNTER
Pt requesting an aerochamber. Hers was lost.   RX pended.  Dr. Monica Anderson please review and sign

## 2021-09-22 NOTE — TELEPHONE ENCOUNTER
From: Nick December  To: Rigo Gore MD  Sent: 9/22/2021 3:45 PM CDT  Subject: Spacer Please    Gary!!! May I please have a spacer? I lost ours. Pharmacy is [facility] on [address] [place], [place]. [address]. [phone].     Started on my Presnison

## 2022-02-08 NOTE — TELEPHONE ENCOUNTER
Name: Yissel Shepard    Chief Complaint   Patient presents with   • Follow-up       HPI:     Yissel Shepard is a 52 year old female who presents for follow up of hyperlipidemia and GERD. Taking chol meds regularly. Watching diet. Exercises 1 hr 2-3 days a week. Doing well and has no complaints.     Past Medical History:   Diagnosis Date   • Gastroesophageal reflux disease without esophagitis    • History of colon polyps 08/05/2021   • Mixed hyperlipidemia 02/08/2022       Family History   Problem Relation Age of Onset   • Stroke Mother    • Hypertension Mother    • Hyperlipidemia Mother    • Hyperlipidemia Father    • Hypertension Father    • Heart disease Father    • Stroke Father    • Patient is unaware of any medical problems Sister    • Cancer Paternal Grandfather          liver and stomach   • Patient is unaware of any medical problems Daughter    • Patient is unaware of any medical problems Son        Social History     Tobacco Use   • Smoking status: Never Smoker   • Smokeless tobacco: Never Used   • Tobacco comment: Additional Findings: Tobacco Non-User: Current non-smoker   Vaping Use   • Vaping Use: never used   Substance Use Topics   • Alcohol use: Yes     Comment: socially-- 1-2 / week   • Drug use: Never       Outpatient Medications Marked as Taking for the 2/8/22 encounter (Office Visit) with Stephanie Ghosh MD   Medication Sig Dispense Refill   • atorvastatin (LIPITOR) 40 MG tablet Take 1 tablet by mouth at bedtime. 90 tablet 1   • ezetimibe (ZETIA) 10 MG tablet Take 1 tablet by mouth daily. 90 tablet 1   • esomeprazole (NEXIUM) 40 MG capsule Take 40 mg by mouth daily as needed.      • Multiple Vitamins-Minerals (MULTIVITAMIN ADULT PO)      • cholecalciferol (VITAMIN D3) 1000 UNITS tablet Take 1,000 Units by mouth daily.     • CALCIUM CITRATE-VITAMIN D PO Take by mouth daily.         ALLERGIES:  No Known Allergies    Review of Systems   Constitutional: Negative.    HENT: Negative.    Eyes:  Refill requested for    Name from pharmacy: [facility] 160-4.5 [facility]         Will file in chart as: SYMBICORT 160-4.5 MCG/ACT Inhalation Aerosol         Sig: TAKE 2 PUFFS BY MOUTH TWICE A DAY    Disp:  10.2 Inhaler    Refills:  2    Start: 8/26/2020 Negative.    Respiratory: Negative for cough and shortness of breath.    Cardiovascular: Negative for chest pain, palpitations and leg swelling.   Gastrointestinal: Negative.    Genitourinary: Negative.    Musculoskeletal: Negative for arthralgias and back pain.   Neurological: Negative.    Psychiatric/Behavioral: Negative.        PHYSICAL EXAM:    Visit Vitals  /70   Pulse 79   Temp 98.8 °F (37.1 °C) (Temporal)   Resp 16   Ht 5' 6\" (1.676 m)   Wt 79.9 kg (176 lb 1.6 oz)   SpO2 99%   BMI 28.42 kg/m²       Physical Exam  Vitals and nursing note reviewed.   Constitutional:       Appearance: Normal appearance.   Neck:      Thyroid: No thyromegaly.   Cardiovascular:      Rate and Rhythm: Normal rate and regular rhythm.      Heart sounds: Normal heart sounds.   Pulmonary:      Effort: Pulmonary effort is normal.      Breath sounds: Normal breath sounds. No wheezing or rales.   Musculoskeletal:      Right lower leg: No edema.      Left lower leg: No edema.   Neurological:      Mental Status: She is alert and oriented to person, place, and time.   Psychiatric:         Mood and Affect: Mood normal.         LABS:  Reviewed with patient.     No visits with results within 1 Day(s) from this visit.   Latest known visit with results is:   Lab Services on 01/31/2022   Component Date Value Ref Range Status   • Cholesterol 01/31/2022 195  <=199 mg/dL Final    Desirable         <200  Borderline High   200 to 239  High              >=240   • Triglycerides 01/31/2022 47  <=149 mg/dL Final    Normal            <150  Borderline High   150 to 199  High              200 to 499  Very High         >=500   • HDL 01/31/2022 67  >=50 mg/dL Final    Low              <40  Borderline Low   40 to 49  Near Optimal     50 to 59  Optimal          >=60   • LDL 01/31/2022 119  <=129 mg/dL Final    OPTIMAL           <100  NEAR OPTIMAL      100 to 129  BORDERLINE HIGH   130 to 159  HIGH              160 to 189  VERY HIGH         >=190   • Non-HDL  Cholesterol 01/31/2022 128  mg/dL Final    Therapeutic Target:  CHD and risk equivalents  <130  Multiple risk factors     <160  0 to 1 risk factor        <190   • Cholesterol/ HDL Ratio 01/31/2022 2.9  <=4.4 Final   • WBC 01/31/2022 5.6  4.2 - 11.0 K/mcL Final   • RBC 01/31/2022 4.72  4.00 - 5.20 mil/mcL Final   • HGB 01/31/2022 13.1  12.0 - 15.5 g/dL Final   • HCT 01/31/2022 42.2  36.0 - 46.5 % Final   • MCV 01/31/2022 89.4  78.0 - 100.0 fl Final   • MCH 01/31/2022 27.8  26.0 - 34.0 pg Final   • MCHC 01/31/2022 31.0 (A) 32.0 - 36.5 g/dL Final   • RDW-CV 01/31/2022 13.1  11.0 - 15.0 % Final   • RDW-SD 01/31/2022 43.0  39.0 - 50.0 fL Final   • PLT 01/31/2022 350  140 - 450 K/mcL Final   • NRBC 01/31/2022 0  <=0 /100 WBC Final   • Neutrophil, Percent 01/31/2022 58  % Final   • Lymphocytes, Percent 01/31/2022 30  % Final   • Mono, Percent 01/31/2022 9  % Final   • Eosinophils, Percent 01/31/2022 2  % Final   • Basophils, Percent 01/31/2022 1  % Final   • Immature Granulocytes 01/31/2022 0  % Final   • Absolute Neutrophils 01/31/2022 3.3  1.8 - 7.7 K/mcL Final   • Absolute Lymphocytes 01/31/2022 1.7  1.0 - 4.0 K/mcL Final   • Absolute Monocytes 01/31/2022 0.5  0.3 - 0.9 K/mcL Final   • Absolute Eosinophils  01/31/2022 0.1  0.0 - 0.5 K/mcL Final   • Absolute Basophils 01/31/2022 0.1  0.0 - 0.3 K/mcL Final   • Absolute Immmature Granulocytes 01/31/2022 0.0  0.0 - 0.2 K/mcL Final       ASSESSMENT/PLAN:      Diagnoses and all orders for this visit:  Mixed hyperlipidemia  -     SGPT  -     atorvastatin (LIPITOR) 40 MG tablet; Take 1 tablet by mouth at bedtime.  -     ezetimibe (ZETIA) 10 MG tablet; Take 1 tablet by mouth daily.  Laboratory examination ordered as part of a routine general medical examination  -     CBC WITH DIFFERENTIAL; Future  -     COMPREHENSIVE METABOLIC PANEL; Future  -     LIPID PANEL WITHOUT REFLEX; Future  -     THYROID STIMULATING HORMONE REFLEX; Future    Chol is controlled with meds. Continue  current meds. .   Check GPT.     Return in about 6 months (around 8/8/2022) for PREVENTIVE CARE. Labs prior.       Stephanie Ghosh MD

## 2023-12-28 NOTE — PROGRESS NOTES
Name: Bobo Bonilla   : 1980   DOS: 2019     Pain Clinic Follow Up Visit:   Patient presents with: Follow - Up: post injection relief reported = 50% within first day. States pain then returned.        Bobo Bonilla is a 34year old female with span intact. Inspection:  Ambulates with well-coordinated, fluid, non-antalgic gait. Gait is normal.  Neck: Mild tenderness to palpation of cervical paravertebral muscles. Minimal restriction in lateral rotation  Back: Tender.        IMAGES:     Cervica relief and increase function with oxycodone 10 mg Tablets, no more than 3 tablets/day. I am willing to write her a short course of this medication until she can get her injections.   I was very clear that I would not be doing long-term medication managemen 68 yo female with HTN, HLD, DM2, HFpEF, CKD III, DVT, Uterine CA, COPD on home o2 who presented with complaints of shortness of breath. Admitted with acute on chronic resp failure and acute HFpEF exacerbation with likely with Superimposed bacterial Pneumonia in setting of RSV and new onset AFib and worsening GFR.    1. T2DM complicated by CKD - steroid induced hyperglycemia  - Continue premeal admelog 40 units TID (hold if NPO)  - Continue lantus 50 units BID  - On high dose admleog sliding scale with meals  - On steroid taper (Prednisone 40mg today, ordered for 30mg tomorrow) will adjust insulin doses as glycemic control improved on lower steroids doses    2. Acute on chronic respiratory failure and Acute HFpEF exacerbation with likely with Superimposed bacterial Pneumonia in setting of RSV  - IV Abx  - Steroids taper as per primary team    3. HLD  - Continue statin 66 yo female with HTN, HLD, DM2, HFpEF, CKD III, DVT, Uterine CA, COPD on home o2 who presented with complaints of shortness of breath. Admitted with acute on chronic resp failure and acute HFpEF exacerbation with likely with Superimposed bacterial Pneumonia in setting of RSV and new onset AFib and worsening GFR.    1. T2DM complicated by CKD - steroid induced hyperglycemia  - Continue premeal admelog 40 units TID (hold if NPO)  - Continue lantus 50 units BID  - On high dose admleog sliding scale with meals  - On steroid taper (Prednisone 40mg today, ordered for 30mg tomorrow) will adjust insulin doses as glycemic control improved on lower steroids doses    2. Acute on chronic respiratory failure and Acute HFpEF exacerbation with likely with Superimposed bacterial Pneumonia in setting of RSV  - IV Abx  - Steroids taper as per primary team    3. HLD  - Continue statin

## 2024-03-07 NOTE — PROGRESS NOTES
ADAM  Doing well, +FM  Denies VB/LOF/uctx  Mode of delivery:   anticipated   GBS collected (35-37)next visit  RTC 1 week  NST reactive  Weekly visits with mfm
hide

## 2024-06-14 NOTE — TELEPHONE ENCOUNTER
Identified patient with two patient identifiers (name and ). Reviewed chart in preparation for visit and have obtained necessary documentation.    Deena Mullen is a 37 y.o. female  Chief Complaint   Patient presents with    Follow-up     Breast Abcess     BP (!) 157/94 (Site: Left Lower Arm, Position: Sitting, Cuff Size: Large Adult)   Pulse 91   Temp 98.5 °F (36.9 °C) (Oral)   Resp 18   Ht 1.829 m (6')   Wt (!) 205.2 kg (452 lb 6.4 oz)   LMP 2023   SpO2 97%   BMI 61.36 kg/m²     1. Have you been to the ER, urgent care clinic since your last visit?  Hospitalized since your last visit?yes    2. Have you seen or consulted any other health care providers outside of the [facility] since your last visit?  Include any pap smears or colon screening. yes   Patient and provider made aware of elevated BP x2. Patient asymptomatic. Patient reminded to monitor BP, continue to take BP medications if prescribed, and follow up with PCP/Cardiologist.  Patient expressed understanding and agreement.      pt returning phone call. performed.    FINDINGS:  There is a heterogeneous, complex collection measuring 6.0 x 2.5 x 5.8 cm. There  is no significant hyperemia.    Impression  Large complex collection at the site of palpable abnormality in the right chest  wall.        Assessment  Problem List Items Addressed This Visit    None  Visit Diagnoses       Breast abscess    -  Primary    Relevant Orders    April Dwyer MD, Breast Surgery, [place]                Plan  Concern for possible lactiferous duct obstruction, may require further excision/intervention for recurrent breast abscesses. Recommend follow up with a breast surgeon, referral placed. All patient's questions were answered.      Celina Stewart, DO

## 2024-07-29 ENCOUNTER — TELEPHONE (OUTPATIENT)
Dept: ALLERGY | Facility: CLINIC | Age: 44
End: 2024-07-29

## 2024-07-29 NOTE — TELEPHONE ENCOUNTER
PT calling to find out if she ever got Hepatis B titers or vaccination. She needs record of it for a new job.     RN informed patient is appears she got the Hepatitis B titer drawn on 9/9/2016. Pt attempted to locate record via [employer], but was unable to see that specific result.     Pt requesting if there is any way to send that record to her?    Record sent to patient via [employer].

## (undated) DEVICE — AVANOS* TUOHY EPIDURAL NEEDLE: Brand: AVANOS

## (undated) DEVICE — CAP,BOUFFANT,SPUNBOND,BLUE,24": Brand: MEDLINE INDUSTRIES, INC.

## (undated) DEVICE — 3M™ TEGADERM™ TRANSPARENT FILM DRESSING, 1626W, 4 IN X 4-3/4 IN (10 CM X 12 CM), 50 EACH/CARTON, 4 CARTON/CASE: Brand: 3M™ TEGADERM™

## (undated) DEVICE — PAIN TRAY: Brand: MEDLINE INDUSTRIES, INC.

## (undated) DEVICE — GLOVE SURG SENSICARE SZ 6-1/2

## (undated) DEVICE — Device

## (undated) DEVICE — BANDAID COVERLET 1X3

## (undated) DEVICE — GLOVE SURG SENSICARE SZ 7-1/2

## (undated) DEVICE — GLOVE SURG SENSICARE SZ 7

## (undated) NOTE — MR AVS SNAPSHOT
After Visit Summary   2017    Thierry Mckeon    MRN: [id number]           Visit Information        Provider Department Dept Phone    2017  3:30 PM [facility] PNORM1   Outpt [phone]      Your Vitals Were     BP Pulse Wt LMP 3/14/2017 9:15 AM Domingo Carmona [facility], [address] [place] [place] Alessandra Teran     3/14/2017 9:45 AM [facility] now offers Video Visits through [address] for adult and pediatr

## (undated) NOTE — Clinical Note
IMPRESSION: IUP at 31w4d Borderline SGA: With associated small AC and short femur length AMA: low-risk cell free fetal DNA, declined invasive testing  RECOMMENDATIONS: Continue care with Dr. Luis Garcia Follow-up growth ultrasound in 3 weeks Twice weekly AP gavin

## (undated) NOTE — MR AVS SNAPSHOT
[address], [facility] [address] [id number] [phone]               Thank you for choosing us for your health care visit with Nanci Hirsch MD.  We are glad to serve you and happy to provide you with this summar Commonly known as:  SUDOGEST           QNASL 80 MCG/ACT Aers   Generic drug:  Beclomethasone Dipropionate   USE 2 SPRAYS NASALLY DAILY           VIRT-PN DHA 27-0.6-0.4-300 MG Caps   TK 1 C PO D                   Immunizations Administered in the [facility]

## (undated) NOTE — MR AVS SNAPSHOT
[address] [address] [phone]               Thank you for choosing us for your health care visit with JARED Green.   We are glad to serve you and happy to provide you with this sum [address] [facility], [facility] [address] [address]   [phone]              Allergies as of Feb 14, 2017     Tetracycline Swelling    joint                Today's Vital Signs     BP Height Weight BMI       108/62 mmHg 60\" 148 lb 28.90 kg/m2          C

## (undated) NOTE — LETTER
Patient Name: Ingris Winn  YOB: 1980          MRN number:  [id number]  Date:  2/25/2019  Referring Physician:  Dr. Meghan Morales      Discharge Summary  Dx: S/P right knee arthroscopy         Authorized # of Visits:  10 requested (8 approved) squatting and carrying mod weight items without issue. Educated on HEP progression and return to formal gym program. At this time, patient has met all goals and feel she is appropriate for D/C from PT per plan as listed below.      Objective:   TM jogging: for this course of care. Thank you for your referral. If you have any questions, please contact me at Dept: [phone].     Sincerely,  Electronically signed by therapist: Jeremy Peck PT, DPT

## (undated) NOTE — LETTER
08/31/20        [address] [facility] [address]      Dear Rea Webster,    [facility] records indicate that you have outstanding lab work and or testing that was ordered for you and has not yet been completed:  Orders Placed This Encounter

## (undated) NOTE — MR AVS SNAPSHOT
After Visit Summary   2017    Sonny Carson    MRN: [id number]           Visit Information        Provider Department Dept Phone    2017  3:30 PM [address] PNORM1   Outpt [phone]      Your Vitals Were     BP Pulse LMP Follow-up Instructions     Return in about 1 week (around 3/3/2017). [facility] now offers Video Visits through [address] for adult and pediatric patients.   Video Visits are available Monday - Friday for many common conditions such as allergies, c

## (undated) NOTE — MR AVS SNAPSHOT
[facility], [facility]  [address] [facility], [facility] [address] [address] [phone]               Thank you for choosing us for your health care visit with JARED Ellsworth.   We are glad to serve you and happy to provide you with this sum Instructions and Information about Your Health    Drink 10 or more ounces daily of water    FETAL MOVEMENT CHART    Begin counting the baby's movements when you awake in the morning, or at approximately 9:00 a.m.     Count ten separate times that the baby m You can access your MyChart to more actively manage your health care and view more details from this visit by going to [email or web address].   If you've recently had a stay at the Hospital you can access your discharge instructions in Barb Pickens by elias

## (undated) NOTE — LETTER
Patient Name: Ernst Coates        : 1980       Medical Record #: [id number]    CONSENT FOR PROCEDURES/SEDATION    Date: 2018       Time: 11:46 AM        1.  I authorize the performance upon Ernst Coates the following:    Trigger point injec

## (undated) NOTE — MR AVS SNAPSHOT
[facility], [facility]  [address] [facility] [place], [place] [address] [address]  [phone]               Thank you for choosing us for your health care visit with JARED Blount.   We are glad to serve you and happy to provide you with this sum ZACK (advanced maternal age) primigravida 33+, third trimester [O09.513]                 Follow-up Instructions     Return in about 1 week (around 3/7/2017) for ADAM/ NST with MD.         Reason for Today's Visit     Prenatal Care           Medical Issues Minda Heller Take 60 mg by mouth every 4 (four) hours as needed for congestion (Taking 1-2 tablets a day as needed).    Commonly known as:  SUDOGEST           QNASL 80 MCG/ACT Aers   Generic drug:  Beclomethasone Dipropionate   USE 2 SPRAYS NASALLY DAILY           VIRT-

## (undated) NOTE — LETTER
Patient Name: Dax Almodovar        : 1980       Medical Record #: [id number]    CONSENT FOR PROCEDURES/SEDATION    Date: 2020       Time: 2:21 PM        1.  I authorize the performance upon Dax Almodovar the following:  LEFT CERVICAL PARAVERT

## (undated) NOTE — LETTER
[address]ines Do  [address] [facility] [address] [facility]       01/09/18        Patient: Sameer Schwartz   YOB: 1980   Date of Visit: 1/9/2018       Dear  Dr. Koffi Nichole DO,      Thank you for referring Sameer Schwartz to my practice.   Ple

## (undated) NOTE — Clinical Note
IMPRESSION:  IUP at 33w4d  Borderline IUGR: With associated small AC and short femur length  AMA: low-risk cell free fetal DNA, declined invasive testing Low normal BENJAMÍN: 5.9 cm today, remained stable  RECOMMENDATIONS:  Continue care with Dr. Jacquie Mcdonald  Twice

## (undated) NOTE — Clinical Note
IUP at 34w4d - followed for iugr; now at 8th percentile. BENJAMÍN was low normal (5.7). Sent her to triage for BMTZ. Will recheck fluid and NST tomorrow.

## (undated) NOTE — MR AVS SNAPSHOT
After Visit Summary   2/16/2017    Franny Munguia    MRN: [id number]           Visit Information        Provider Department Dept Phone    2/16/2017  3:00 PM [address] [phone]      Your Vitals Were     LMP                   06/ 3/14/2017 9:15 AM [facility], [address] [place] [address] Alessandra Teran     3/14/2017 9:45 AM Domingo Anderson, [facility] Alessandra Horner       Follow-up Instructions     Return in about 1 week (around 2/23/2017).

## (undated) NOTE — Clinical Note
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 Jesus Mckeon Follow-up growth ultrasound in 4 weeks Weekly NSTs at 36 weeks

## (undated) NOTE — IP AVS SNAPSHOT
[facility] [place] [address] [place], [address] ~ [phone]                Discharge Summary   3/12/2017    Martita Cox           Admission Information        Provider Department    3/12/2017 Marco Tierney MD  1sw-J      Why Last time this was given:  1,000 mg on 3/13/2017  5:14 PM   Commonly known as:  TYLENOL EXTRA STRENGTH        Take 1,000 mg by mouth every 6 (six) hours as needed for Pain.       [    ]    [    ]    [    ]    [    ]       Derek Risk [de-identified] MCG/ACT Aers   Generic michael (12/16/16)  10.7 (12/16/16)  4.05 (12/16/16)  12.3 (12/16/16)  36.8 (12/16/16)  90.9    (12/16/16)  240.0       Recent Hematology Lab Results (cont.)  (Last 3 results in the past 90 days)    Neutrophil % Lymphocyte % Monocyte % Eosinophil % Basophil % Pre after two weeks postpartum  Call your doctor if you have excessive bleeding. More than one pad per hour  Call Lactation Department with any questions/concerns [phone]      In the next few days, a nurse may call you to see how you and baby are doing.

## (undated) NOTE — MR AVS SNAPSHOT
After Visit Summary   2017    Zenon Baldwin    MRN: [id number]           Visit Information        Provider Department Dept Phone    2017  2:30 PM [facility] PNORM1   Outpt [phone]      Your Vitals Were     BP Pulse Wt LMP          13 2/24/2017 10:15 AM Tanya Mendoza, [address] Group, [address] [facility]     2/28/2017 11:15 AM Domingo Landa [facility], [address] [facility]     2/28/2017 11:45 AM Pallavi Delgadillo, [address], [address] [facility], Elyssa Child

## (undated) NOTE — MR AVS SNAPSHOT
[address], [place] [address] [address] [phone]               Thank you for choosing us for your health care visit with Pato Quach MD.  We are glad to serve you and happy to provide you with this sum Again, If the baby has not moved 10 times by 6:00 pm., you need to contact us.     If our office is closed, the answering service will page the doctor on-call.    ------------------------------       week 34     week 35     week 36        M T W T F S S M T [address], [address] Alessandra Horner  ([facility])    238 [facility] [address] [address] [phone]            2017  3:00 PM   Office Visit with [address] Perinatal ([facility] Call [phone] for help. [employer] is NOT to be used for urgent needs. For medical emergencies, dial 911.            Visit [facility] online at  [employer]

## (undated) NOTE — IP AVS SNAPSHOT
[facility] [place]  [address] [place], [address] ~ [phone]                Discharge Summary   2/24/2017    Cherylene Fent           Admission Information        Provider Department    2/24/2017 Gisella Fenton MD  1nw-A      Why (hypertension). Preeclampsia/hypertension can happen during pregnancy and up to 6 weeks following childbirth.   Contact your doctor or midwife immediately if you experience any of the following symptoms:  · Headache that does not go away  · Visual changes [address] [facility])    [address]  [place] [address] [address]   [phone]            Mar 14, 2017  9:15 AM   NST OBGYN with JARED Dutton   [address] [facility], [address] [place], [place] [address]Jase  ([facility] We are concerned for your overall well being:    - If you are a smoker or have smoked in the last 12 months, we encourage you to explore options for quitting.     - If you have concerns related to behavioral health issues or thoughts of harming yourself,

## (undated) NOTE — MR AVS SNAPSHOT
[address], [facility] [address] [id number] [phone]               Thank you for choosing us for your health care visit with Josefine Schwab, MD.  We are glad to serve you and happy to provide you with this jackson 34 weeks gestation of pregnancy    -  Primary      Instructions and Information about [address] [place] [address] counting the baby's movements when you awake in the morning, or at approximately 9:00 a.m.     Count ten separate times that You can access your MyChart to more actively manage your health care and view more details from this visit by going to [email or web address].   If you've recently had a stay at the Hospital you can access your discharge instructions in [address] by elias

## (undated) NOTE — MR AVS SNAPSHOT
After Visit Summary   3/3/2017    Ernst Coates    MRN: [id number]           Visit Information        Provider Department Dept Phone    3/3/2017  1:30 PM [address] PNORM1   Outpt [phone]      Your Vitals Were     BP Pulse Wt LMP          12 conditions such as allergies, colds, cough, fever, rash, sore throat, headache and pink eye. The cost for a Video Visit is currently $10.         If you receive a survey from [address], please take a few minutes to complete it and provide feedback.  We s

## (undated) NOTE — MR AVS SNAPSHOT
[address], [facility] [address] [id number] [phone]               Thank you for choosing us for your health care visit with Zari Cee MD.  We are glad to serve you and happy to provide you with this sum Take 1 capsule by mouth daily. MyChart     Visit MyChart  You can access your MyChart to more actively manage your health care and view more details from this visit by going to [email or web address]   If you've recently had a stay

## (undated) NOTE — MR AVS SNAPSHOT
[facility], [facility]  [address] [place], [place] [address] [address]  [phone]               Thank you for choosing us for your health care visit with JARED Diaz.   We are glad to serve you and happy to provide you with this sum [facility]  [phone]       Follow Up with Our Office     Return in about 1 week (around 2017) for ADAM/ NST.       Your Appointments     2017  3:00 PM   Office Visit with [address] Perinatal ([facility] You can access your MyChart to more actively manage your health care and view more details from this visit by going to [email or web address].   If you've recently had a stay at the Hospital you can access your discharge instructions in [address] by elias

## (undated) NOTE — MR AVS SNAPSHOT
[address], [facility] [address] [phone]               Thank you for choosing us for your health care visit with Michelle Osman MD.  We are glad to serve you and happy to provide you with this summary Pseudoephedrine HCl 60 MG Tabs   Take 60 mg by mouth every 4 (four) hours as needed for congestion (Taking 1-2 tablets a day as needed).    Commonly known as:  SUDOGEST           QNASL 80 MCG/ACT Aers   Generic drug:  Beclomethasone Dipropionate   USE 2 SP

## (undated) NOTE — MR AVS SNAPSHOT
[address], [place] [address] [address] [phone]               Thank you for choosing us for your health care visit with Gisella Fenton MD.  We are glad to serve you and happy to provide you with this sum 1-2 tablets a day as needed). Commonly known as:  SUDOGEST           QNASL 80 MCG/ACT Aers   Generic drug:  Beclomethasone Dipropionate   USE 2 SPRAYS NASALLY DAILY           VIRT-PN DHA 27-0.6-0.4-300 MG Caps   Take 1 capsule by mouth daily.

## (undated) NOTE — MR AVS SNAPSHOT
After Visit Summary   2017    Ursula Wright    MRN: [id number]           Visit Information        Provider Department Dept Phone    2017  3:00 PM [facility] PNORM1   Outpt [phone]      Your Vitals Were     BP Pulse Resp Ht Wt BMI headache and pink eye. The cost for a Video Visit is currently $10.         If you receive a survey from [employer], please take a few minutes to complete it and provide feedback.  We strive to deliver the best patient experience and are looking for ways

## (undated) NOTE — Clinical Note
IMPRESSION:  IUP at 33w4d Borderline IUGR: With associated small AC and short femur length  AMA: low-risk cell free fetal DNA, declined invasive testing  RECOMMENDATIONS:  Continue care with Dr. Rebel Azul Follow-up growth ultrasound in 1 week Twice weekly AP

## (undated) NOTE — MR AVS SNAPSHOT
[address], [facility] [address] [address] [phone]               Thank you for choosing us for your health care visit with Pipo Rojas MD.  We are glad to serve you and happy to provide you with this summar Commonly known as:  SUDOGEST           QNASL 80 MCG/ACT Aers   Generic drug:  Beclomethasone Dipropionate   USE 2 SPRAYS NASALLY DAILY           VIRT-PN DHA 27-0.6-0.4-300 MG Caps   TK 1 C PO D                   Follow-up Instructions     Return in about 2

## (undated) NOTE — MR AVS SNAPSHOT
[address], [facility] [address] [phone]               Thank you for choosing us for your health care visit with Ben Phan MD.  We are glad to serve you and happy to provide you with this sum Marshel Dial  ([facility])    [address], [facility] [address] [address] [phone]              Allergies as of Feb 11, 2017     Tetracycline Swelling    joint                Today's Vital

## (undated) NOTE — MR AVS SNAPSHOT
After Visit Summary   2017    Kae Aguero    MRN: [id number]           Visit Information        Provider Department Dept Phone    2017  2:00 PM [facility] PNORM1   Outpt [phone]      Your Vitals Were     BP Pulse Wt LMP          12 will help us do so. For more information on [employer], please visit [email or web address]

## (undated) NOTE — MR AVS SNAPSHOT
After Visit Summary   3/10/2017    Shelbie Rosales    MRN: [id number]           Visit Information        Provider Department Dept Phone    3/10/2017  1:00 PM [facility] PNORM1   Outpt [phone]      Your Vitals Were     BP Pulse Resp Ht Wt BMI experience and are looking for ways to make improvements. Your feedback will help us do so. For more information on [employer], please visit [email or web address]

## (undated) NOTE — Clinical Note
IMPRESSION:  IUP at 36w5d IUGR: With associated small AC and short femur length  AMA: low-risk cell free fetal DNA, declined invasive testing  Low normal BENJAMÍN: 6.5 cm today, remained stable  RECOMMENDATIONS:  Continue care with Dr. Leslie Boyer at 40 wee

## (undated) NOTE — Clinical Note
IMPRESSION:  IUP at 32w4d  Borderline IUGR: With associated small AC and short femur length AMA: low-risk cell free fetal DNA, declined invasive testing  RECOMMENDATIONS:  Continue care with Dr. Bharath Romero Follow-up growth ultrasound in 3 weeks  Twice weekly A

## (undated) NOTE — MR AVS SNAPSHOT
[facility]  [address] [facility] [address]  [phone]               Thank you for choosing us for your health care visit with [facility] Derek Gonzalez.   We are glad to serve you and happy to provide you with this summary o nipple and flip the shield right side out, drawing your nipple and areola into the                shield as much as possible. ? Massage breasts and if possible, hand express  some breastmilk into the nipple shield.      Latching your baby on flow bottle with 1 to 2 +oz to satisfaction. After feeding your baby:  ? Pump your breasts for 10-15 minutes using a hospital grade rental breast pump, after most daytime feedings.  This may help maintain adequate milk supply while using the · Compressing the breast when your baby sucks can increase milk flow. · At least 6-8 wet diapers and at least 3-4 soft, yellow seedy stools every 24 hours. Use breastfeeding journal.  · Weight gain of at least 4-7 ounces per week    Supplementation:    If Weight check sooner if wet or stool diapers decrease. Have weight checked again within 1-3 days of decreasing/stopping supplements. For additional information: [employer]. org      Breastfeeding Suggestions for Plugged Duct/Breast · Check with your doctor about taking ibuprofen for relief of discomfort. · Continue to take antibiotic as prescribed even though you may quickly feel better.    · Contact your doctor is you are not feeling significantly better within 1-2 days of starting Pumping should not be painful. ? Place flange on your breasts, centering your nipples. ? Use correct size flange for your nipple size. Nipple should not rub on inner circumference of flange.  If you need a different size flange, contact your nurse or t especially for mothers pumping their breast milk. ? Have a nice tall glass of water, juice, or milk close by to quench your thirst.  ? View the [employer]: Maximizing Milk Production and Hand Expression   http://[email or web address]. [place]. Plan for home:  Offer breast as often as able, particularly if baby appears alert and is rooting. If baby does not appear interested within first 5 minutes of BF, switch to supplemental bottle feeding to complete the feeding.   Use nipple shield to BF if b Pseudoephedrine HCl 60 MG Tabs   Take 60 mg by mouth every 4 (four) hours as needed for congestion (Taking 1-2 tablets a day as needed).    Commonly known as:  SUDOGEST           QNASL 80 MCG/ACT Aers   Generic drug:  Beclomethasone Dipropionate   USE 2 SP